# Patient Record
Sex: FEMALE | Race: WHITE | Employment: FULL TIME | URBAN - METROPOLITAN AREA
[De-identification: names, ages, dates, MRNs, and addresses within clinical notes are randomized per-mention and may not be internally consistent; named-entity substitution may affect disease eponyms.]

---
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
## 2018-03-06 ENCOUNTER — OFFICE VISIT (OUTPATIENT)
Dept: FAMILY MEDICINE | Facility: CLINIC | Age: 18
End: 2018-03-06
Payer: COMMERCIAL

## 2018-03-06 VITALS
HEART RATE: 96 BPM | BODY MASS INDEX: 19.79 KG/M2 | OXYGEN SATURATION: 98 % | SYSTOLIC BLOOD PRESSURE: 98 MMHG | WEIGHT: 130.6 LBS | TEMPERATURE: 98.4 F | DIASTOLIC BLOOD PRESSURE: 60 MMHG | HEIGHT: 68 IN

## 2018-03-06 DIAGNOSIS — J06.9 VIRAL URI: Primary | ICD-10-CM

## 2018-03-06 PROCEDURE — 99213 OFFICE O/P EST LOW 20 MIN: CPT | Performed by: PHYSICIAN ASSISTANT

## 2018-03-06 NOTE — NURSING NOTE
"Chief Complaint   Patient presents with     Cough       Initial BP 98/60 (BP Location: Right arm, Patient Position: Chair, Cuff Size: Adult Regular)  Pulse 96  Temp 98.4  F (36.9  C) (Oral)  Ht 5' 8\" (1.727 m)  Wt 130 lb 9.6 oz (59.2 kg)  SpO2 98%  BMI 19.86 kg/m2 Estimated body mass index is 19.86 kg/(m^2) as calculated from the following:    Height as of this encounter: 5' 8\" (1.727 m).    Weight as of this encounter: 130 lb 9.6 oz (59.2 kg).  Medication Reconciliation: complete      Health Maintenance addressed:  NONE      n/a    "

## 2018-03-06 NOTE — PROGRESS NOTES
"  SUBJECTIVE:   Sola Bowles is a 17 year old female who presents to clinic today for the following health issues:      Acute Illness   Acute illness concerns: cough  Onset: 7 days    Fever: no    Chills/Sweats: no    Headache (location?): YES    Sinus Pressure:no    Conjunctivitis:  no    Ear Pain: no    Rhinorrhea: YES    Congestion: YES    Sore Throat: YES     Cough: YES-productive of yellow sputum, productive of green sputum and some blood    Wheeze: no     Decreased Appetite: no    Nausea: no    Vomiting: no    Diarrhea:  no    Dysuria/Freq.: no    Fatigue/Achiness: no    Sick/Strep Exposure: no     Therapies Tried and outcome:           Problem list and histories reviewed & adjusted, as indicated.  Additional history: as documented    Current Outpatient Prescriptions   Medication Sig Dispense Refill     drospirenone-ethinyl estradiol (SANTI) 3-0.03 MG per tablet Take 1 tablet by mouth daily 84 tablet 3     drospirenone-ethinyl estradiol (SANTI) 3-0.03 MG per tablet Take 1 tablet by mouth daily 84 tablet 3     BP Readings from Last 3 Encounters:   03/06/18 98/60   12/27/17 104/74   08/17/17 98/62    Wt Readings from Last 3 Encounters:   03/06/18 130 lb 9.6 oz (59.2 kg) (54 %)*   12/27/17 135 lb (61.2 kg) (62 %)*   08/17/17 140 lb 6.4 oz (63.7 kg) (71 %)*     * Growth percentiles are based on CDC 2-20 Years data.                    Reviewed and updated as needed this visit by clinical staff       Reviewed and updated as needed this visit by Provider         ROS:  Constitutional, HEENT, cardiovascular, pulmonary, gi and gu systems are negative, except as otherwise noted.    OBJECTIVE:                                                    BP 98/60 (BP Location: Right arm, Patient Position: Chair, Cuff Size: Adult Regular)  Pulse 96  Temp 98.4  F (36.9  C) (Oral)  Ht 5' 8\" (1.727 m)  Wt 130 lb 9.6 oz (59.2 kg)  SpO2 98%  BMI 19.86 kg/m2  Body mass index is 19.86 kg/(m^2).  GENERAL APPEARANCE: healthy, " alert, no distress   HENT: ear canals and TM's normal and nose and mouth without ulcers or lesions  RESP: lungs clear to auscultation - no rales, rhonchi or wheezes  CV: regular rates and rhythm, normal S1 S2, no S3 or S4 and no murmur, click or rub  LYMPHATICS: no cervical adenopathy    Diagnostic test results:  Diagnostic Test Results:  none      ASSESSMENT/PLAN:                                                    (J06.9,  B97.89) Viral URI  (primary encounter diagnosis)  Comment:   Plan: The patient is advised to push fluids, rest, gargle warm salt water, use vaporizer or mist needed  and Return office visit if symptoms persist or worsen.          There are no Patient Instructions on file for this visit.    Ramona Ann Aaseby-Aguilera, PA-C  [facility]

## 2018-03-06 NOTE — MR AVS SNAPSHOT
"              After Visit Summary   3/6/2018    Sola Bowles    MRN: [id number]           Patient Information     Date Of Birth          2000        Visit Information        Provider Department      3/6/2018 9:45 AM Aaseby-Aguilera, Ramona Ann, PA-C [facility]        Today's Diagnoses     Viral URI    -  1       Follow-ups after your visit        Who to contact     If you have questions or need follow up information about today's clinic visit or your schedule please contact [facility] directly at [phone].  Normal or non-critical lab and imaging results will be communicated to you by [employer], letter or phone within 4 business days after the clinic has received the results. If you do not hear from us within 7 days, please contact the clinic through [employer] or phone. If you have a critical or abnormal lab result, we will notify you by phone as soon as possible.  Submit refill requests through [employer] or call your pharmacy and they will forward the refill request to us. Please allow 3 business days for your refill to be completed.          Additional Information About Your Visit        MyChart Information     [employer] gives you secure access to your electronic health record. If you see a primary care provider, you can also send messages to your care team and make appointments. If you have questions, please call your primary care clinic.  If you do not have a primary care provider, please call [phone] and they will assist you.        Care EveryWhere ID     This is your Care EveryWhere ID. This could be used by other organizations to access your [facility] medical records  [id number]        Your Vitals Were     Pulse Temperature Height Pulse Oximetry BMI (Body Mass Index)       96 98.4  F (36.9  C) (Oral) 5' 8\" (1.727 m) 98% 19.86 kg/m2        Blood Pressure from Last 3 Encounters:   03/06/18 98/60   12/27/17 104/74   08/17/17 98/62    Weight from Last 3 Encounters: "   03/06/18 130 lb 9.6 oz (59.2 kg) (54 %)*   12/27/17 135 lb (61.2 kg) (62 %)*   08/17/17 140 lb 6.4 oz (63.7 kg) (71 %)*     * Growth percentiles are based on [facility] 2-20 Years data.              Today, you had the following     No orders found for display         Today's Medication Changes          These changes are accurate as of 3/6/18 10:04 AM.  If you have any questions, ask your nurse or doctor.               Stop taking these medicines if you haven't already. Please contact your care team if you have questions.     ondansetron 4 MG ODT tab   Commonly known as:  ZOFRAN ODT   Stopped by:  Aaseby-Aguilera, Ramona Ann, PA-C                    Primary Care Provider Office Phone # Fax #    Ramona Ann Aaseby-Aguilera, PA-C [phone] [address] [facility] [address]        Equal Access to Services     [facility] HospitalBALDOMERO : Hadii aad ku hadasho Soomaali, waaxda luqadaha, qaybta kaalmada adeegyada, waxay idiin hayaan thoeg kharasridhar thomas . So [facility] [phone].    ATENCIÓN: Si habla español, tiene a grnat disposición servicios gratuitos de asistencia lingüística. LlPremier Health [facility] South [phone].    We comply with applicable federal civil rights laws and [place] laws. We do not discriminate on the basis of race, color, national origin, age, disability, sex, sexual orientation, or gender identity.            Thank you!     Thank you for choosing [facility]  for your care. Our goal is always to provide you with excellent care. Hearing back from our patients is one way we can continue to improve our services. Please take a few minutes to complete the written survey that you may receive in the mail after your visit with us. Thank you!             Your Updated Medication List - Protect others around you: Learn how to safely use, store and throw away your medicines at www.disposemymeds.org.          This list is accurate as of 3/6/18 10:04 AM.  Always use your most recent med list.                    Brand Name Dispense Instructions for use Diagnosis    * drospirenone-ethinyl estradiol 3-0.03 MG per tablet    SANTI    84 tablet    Take 1 tablet by mouth daily    Encounter for initial prescription of contraceptive pills       * drospirenone-ethinyl estradiol 3-0.03 MG per tablet    SANTI    84 tablet    Take 1 tablet by mouth daily    PCOS (polycystic ovarian syndrome)       * Notice:  This list has 2 medication(s) that are the same as other medications prescribed for you. Read the directions carefully, and ask your doctor or other care provider to review them with you.

## 2018-03-20 ENCOUNTER — RECORDS - HEALTHEAST (OUTPATIENT)
Dept: ADMINISTRATIVE | Facility: OTHER | Age: 18
End: 2018-03-20

## 2018-05-22 ENCOUNTER — TRANSFERRED RECORDS (OUTPATIENT)
Dept: HEALTH INFORMATION MANAGEMENT | Facility: CLINIC | Age: 18
End: 2018-05-22

## 2018-05-25 ENCOUNTER — OFFICE VISIT (OUTPATIENT)
Dept: FAMILY MEDICINE | Facility: CLINIC | Age: 18
End: 2018-05-25
Payer: COMMERCIAL

## 2018-05-25 VITALS
DIASTOLIC BLOOD PRESSURE: 79 MMHG | SYSTOLIC BLOOD PRESSURE: 114 MMHG | HEIGHT: 68 IN | TEMPERATURE: 98 F | BODY MASS INDEX: 19.55 KG/M2 | HEART RATE: 72 BPM | RESPIRATION RATE: 16 BRPM | WEIGHT: 129 LBS

## 2018-05-25 DIAGNOSIS — R21 RASH: Primary | ICD-10-CM

## 2018-05-25 LAB
BASOPHILS # BLD AUTO: 0 10E9/L (ref 0–0.2)
BASOPHILS NFR BLD AUTO: 0.4 %
DIFFERENTIAL METHOD BLD: NORMAL
EOSINOPHIL # BLD AUTO: 0.1 10E9/L (ref 0–0.7)
EOSINOPHIL NFR BLD AUTO: 1.1 %
ERYTHROCYTE [DISTWIDTH] IN BLOOD BY AUTOMATED COUNT: 14 % (ref 10–15)
HCT VFR BLD AUTO: 40.2 % (ref 35–47)
HGB BLD-MCNC: 13.3 G/DL (ref 11.7–15.7)
LYMPHOCYTES # BLD AUTO: 1.9 10E9/L (ref 0.8–5.3)
LYMPHOCYTES NFR BLD AUTO: 40 %
MCH RBC QN AUTO: 28.1 PG (ref 26.5–33)
MCHC RBC AUTO-ENTMCNC: 33.1 G/DL (ref 31.5–36.5)
MCV RBC AUTO: 85 FL (ref 78–100)
MONOCYTES # BLD AUTO: 0.5 10E9/L (ref 0–1.3)
MONOCYTES NFR BLD AUTO: 10.2 %
NEUTROPHILS # BLD AUTO: 2.3 10E9/L (ref 1.6–8.3)
NEUTROPHILS NFR BLD AUTO: 48.3 %
PLATELET # BLD AUTO: 240 10E9/L (ref 150–450)
RBC # BLD AUTO: 4.73 10E12/L (ref 3.8–5.2)
WBC # BLD AUTO: 4.7 10E9/L (ref 4–11)

## 2018-05-25 PROCEDURE — 36415 COLL VENOUS BLD VENIPUNCTURE: CPT | Performed by: PHYSICIAN ASSISTANT

## 2018-05-25 PROCEDURE — 99213 OFFICE O/P EST LOW 20 MIN: CPT | Performed by: PHYSICIAN ASSISTANT

## 2018-05-25 PROCEDURE — 85025 COMPLETE CBC W/AUTO DIFF WBC: CPT | Performed by: PHYSICIAN ASSISTANT

## 2018-05-25 RX ORDER — DROSPIRENONE AND ETHINYL ESTRADIOL 0.02-3(28)
KIT ORAL
COMMUNITY
Start: 2018-03-21 | End: 2020-12-03

## 2018-05-25 RX ORDER — DOXYCYCLINE 100 MG/1
100 CAPSULE ORAL
COMMUNITY
Start: 2018-05-22 | End: 2018-05-29

## 2018-05-25 NOTE — PROGRESS NOTES
"  SUBJECTIVE:   Sola Bowles is a 18 year old female who presents to clinic today for the following health issues:      Follow up on UC visit : Brandy was seen in urgent care at school  for a lump in rt axilla which turned out to be infectious.  Was given an antibiotic and this cleared up.  Was told may be due to ingrown hair.  Told to change razoe blades.  Now she noticed a painful rash in left axilla. Still has 2 days left on antibiotic        Problem list and histories reviewed & adjusted, as indicated.  Additional history: as documented        Reviewed and updated as needed this visit by clinical staff  Tobacco  Allergies  Meds  Med Hx  Surg Hx  Fam Hx  Soc Hx      Reviewed and updated as needed this visit by Provider         ROS:  Constitutional, HEENT, cardiovascular, pulmonary, gi and gu systems are negative, except as otherwise noted.    OBJECTIVE:                                                    /79 (BP Location: Right arm, Patient Position: Chair, Cuff Size: Adult Regular)  Pulse 72  Temp 98  F (36.7  C) (Oral)  Resp 16  Ht 5' 8\" (1.727 m)  Wt 129 lb (58.5 kg)  LMP 04/25/2018  Breastfeeding? No  BMI 19.61 kg/m2  Body mass index is 19.61 kg/(m^2).  GENERAL APPEARANCE: healthy, alert and no distress  RESP: lungs clear to auscultation - no rales, rhonchi or wheezes  CV: regular rates and rhythm, normal S1 S2, no S3 or S4 and no murmur, click or rub  LYMPHATICS: no cervical adenopathy  SKIN:  2 x 1 cm mild erythema in left axilla, no swelling or discreet pustules         ASSESSMENT/PLAN:                                                    1. Rash   resolving.  Treated for folliculitis previously.  Advised to change razor and deoderant .  Please follow-up if symptoms fail to resolve or worsen    - CBC with platelets differential          Ramona Ann Aaseby-Aguilera, PA-C  [facility]      "

## 2018-05-25 NOTE — MR AVS SNAPSHOT
"              After Visit Summary   5/25/2018    Sola Bowles    MRN: [id number]           Patient Information     Date Of Birth          2000        Visit Information        Provider Department      5/25/2018 10:45 AM Aaseby-Aguilera, Ramona Ann, PA-C [facility]        Today's Diagnoses     Rash    -  1       Follow-ups after your visit        Who to contact     If you have questions or need follow up information about today's clinic visit or your schedule please contact [facility] directly at [phone].  Normal or non-critical lab and imaging results will be communicated to you by "[employer]"hart, letter or phone within 4 business days after the clinic has received the results. If you do not hear from us within 7 days, please contact the clinic through [employer] or phone. If you have a critical or abnormal lab result, we will notify you by phone as soon as possible.  Submit refill requests through [employer] or call your pharmacy and they will forward the refill request to us. Please allow 3 business days for your refill to be completed.          Additional Information About Your Visit        MyChart Information     [employer] gives you secure access to your electronic health record. If you see a primary care provider, you can also send messages to your care team and make appointments. If you have questions, please call your primary care clinic.  If you do not have a primary care provider, please call [phone] and they will assist you.        Care EveryWhere ID     This is your Care EveryWhere ID. This could be used by other organizations to access your [facility] medical records  [id number]        Your Vitals Were     Pulse Temperature Respirations Height Last Period Breastfeeding?    72 98  F (36.7  C) (Oral) 16 5' 8\" (1.727 m) 04/25/2018 No    BMI (Body Mass Index)                   19.61 kg/m2            Blood Pressure from Last 3 Encounters:   05/25/18 114/79   03/06/18 98/60 "   12/27/17 104/74    Weight from Last 3 Encounters:   05/25/18 129 lb (58.5 kg)   03/06/18 130 lb 9.6 oz (59.2 kg) (54 %)*   12/27/17 135 lb (61.2 kg) (62 %)*     * Growth percentiles are based on [facility] 2-20 Years data.              We Performed the Following     CBC with platelets differential        Primary Care Provider Office Phone # Fax #    Ramona Ann Aaseby-Aguilera, PA-C [phone] [id number] [facility] [id number]        Equal Access to Services     [facility]: Hadii aad ku hadasho Soomaali, waaxda luqadaha, qaybta kaalmada adeana mariayada, kevin thomas . So [facility] [phone].    ATENCIÓN: Si habla español, tiene a grant disposición servicios gratuitos de asistencia lingüística. Llame al [phone].    We comply with applicable federal civil rights laws and [place] laws. We do not discriminate on the basis of race, color, national origin, age, disability, sex, sexual orientation, or gender identity.            Thank you!     Thank you for choosing [facility]  for your care. Our goal is always to provide you with excellent care. Hearing back from our patients is one way we can continue to improve our services. Please take a few minutes to complete the written survey that you may receive in the mail after your visit with us. Thank you!             Your Updated Medication List - Protect others around you: Learn how to safely use, store and throw away your medicines at www.disposemymeds.org.          This list is accurate as of 5/25/18 11:59 PM.  Always use your most recent med list.                   Brand Name Dispense Instructions for use Diagnosis    doxycycline 100 MG capsule    VIBRAMYCIN     Take 100 mg by mouth        ROXIE 3-0.02 MG per tablet   Generic drug:  drospirenone-ethinyl estradiol

## 2018-09-13 ENCOUNTER — RECORDS - HEALTHEAST (OUTPATIENT)
Dept: ADMINISTRATIVE | Facility: OTHER | Age: 18
End: 2018-09-13

## 2019-01-18 NOTE — TELEPHONE ENCOUNTER
Please review labs and advise to abnormal results mom & pt calling for results/     Kate Sanchez RN     Abdomen soft, nontender, nondistended, bowel sounds present in all 4 quadrants.

## 2019-06-19 ENCOUNTER — RECORDS - HEALTHEAST (OUTPATIENT)
Dept: LAB | Facility: HOSPITAL | Age: 19
End: 2019-06-19

## 2019-06-21 LAB
GAMMA INTERFERON BACKGROUND BLD IA-ACNC: 0.05 IU/ML
M TB IFN-G BLD-IMP: NEGATIVE
MITOGEN IGNF BCKGRD COR BLD-ACNC: -0.01 IU/ML
MITOGEN IGNF BCKGRD COR BLD-ACNC: 0 IU/ML
QTF INTERPRETATION: NORMAL
QTF MITOGEN - NIL: 8.03 IU/ML

## 2019-08-01 ENCOUNTER — TRANSFERRED RECORDS (OUTPATIENT)
Dept: HEALTH INFORMATION MANAGEMENT | Facility: CLINIC | Age: 19
End: 2019-08-01

## 2019-08-01 LAB — PAP SMEAR - HIM PATIENT REPORTED: NEGATIVE

## 2019-08-07 ENCOUNTER — RECORDS - HEALTHEAST (OUTPATIENT)
Dept: LAB | Facility: HOSPITAL | Age: 19
End: 2019-08-07

## 2019-08-07 LAB — HBV SURFACE AB SERPL IA-ACNC: POSITIVE M[IU]/ML

## 2019-09-12 ENCOUNTER — RECORDS - HEALTHEAST (OUTPATIENT)
Dept: ADMINISTRATIVE | Facility: OTHER | Age: 19
End: 2019-09-12

## 2019-09-12 LAB — PAP SMEAR - HIM PATIENT REPORTED: NORMAL

## 2019-10-02 ENCOUNTER — HEALTH MAINTENANCE LETTER (OUTPATIENT)
Age: 19
End: 2019-10-02

## 2019-10-30 ENCOUNTER — OFFICE VISIT - HEALTHEAST (OUTPATIENT)
Dept: FAMILY MEDICINE | Facility: CLINIC | Age: 19
End: 2019-10-30

## 2019-10-30 DIAGNOSIS — Z23 NEED FOR INFLUENZA VACCINATION: ICD-10-CM

## 2019-10-30 DIAGNOSIS — G89.29 CHRONIC LOW BACK PAIN WITHOUT SCIATICA, UNSPECIFIED BACK PAIN LATERALITY: ICD-10-CM

## 2019-10-30 DIAGNOSIS — Z13.0 SCREENING FOR ENDOCRINE, NUTRITIONAL, METABOLIC AND IMMUNITY DISORDER: ICD-10-CM

## 2019-10-30 DIAGNOSIS — Z11.4 SCREENING FOR HIV WITHOUT PRESENCE OF RISK FACTORS: ICD-10-CM

## 2019-10-30 DIAGNOSIS — G43.109 MIGRAINE WITH AURA AND WITHOUT STATUS MIGRAINOSUS, NOT INTRACTABLE: ICD-10-CM

## 2019-10-30 DIAGNOSIS — M54.50 CHRONIC LOW BACK PAIN WITHOUT SCIATICA, UNSPECIFIED BACK PAIN LATERALITY: ICD-10-CM

## 2019-10-30 DIAGNOSIS — Z00.00 VISIT FOR PREVENTIVE HEALTH EXAMINATION: ICD-10-CM

## 2019-10-30 DIAGNOSIS — Z11.8 SPECIAL SCREENING EXAMINATION FOR CHLAMYDIAL DISEASE: ICD-10-CM

## 2019-10-30 DIAGNOSIS — Z13.29 SCREENING FOR ENDOCRINE, NUTRITIONAL, METABOLIC AND IMMUNITY DISORDER: ICD-10-CM

## 2019-10-30 DIAGNOSIS — Z13.228 SCREENING FOR ENDOCRINE, NUTRITIONAL, METABOLIC AND IMMUNITY DISORDER: ICD-10-CM

## 2019-10-30 DIAGNOSIS — Z13.21 SCREENING FOR ENDOCRINE, NUTRITIONAL, METABOLIC AND IMMUNITY DISORDER: ICD-10-CM

## 2019-10-30 LAB
ALBUMIN SERPL-MCNC: 3.7 G/DL (ref 3.5–5)
ALP SERPL-CCNC: 52 U/L (ref 45–120)
ALT SERPL W P-5'-P-CCNC: 11 U/L (ref 0–45)
ANION GAP SERPL CALCULATED.3IONS-SCNC: 9 MMOL/L (ref 5–18)
AST SERPL W P-5'-P-CCNC: 19 U/L (ref 0–40)
BILIRUB SERPL-MCNC: 0.8 MG/DL (ref 0–1)
BUN SERPL-MCNC: 16 MG/DL (ref 8–22)
C REACTIVE PROTEIN LHE: 0.2 MG/DL (ref 0–0.8)
CALCIUM SERPL-MCNC: 9.6 MG/DL (ref 8.5–10.5)
CHLORIDE BLD-SCNC: 103 MMOL/L (ref 98–107)
CHOLEST SERPL-MCNC: 246 MG/DL
CO2 SERPL-SCNC: 25 MMOL/L (ref 22–31)
CREAT SERPL-MCNC: 0.8 MG/DL (ref 0.6–1.1)
ERYTHROCYTE [SEDIMENTATION RATE] IN BLOOD BY WESTERGREN METHOD: 11 MM/HR (ref 0–20)
FASTING STATUS PATIENT QL REPORTED: ABNORMAL
GFR SERPL CREATININE-BSD FRML MDRD: >60 ML/MIN/1.73M2
GLUCOSE BLD-MCNC: 89 MG/DL (ref 70–125)
HDLC SERPL-MCNC: 102 MG/DL
HIV 1+2 AB+HIV1 P24 AG SERPL QL IA: NEGATIVE
LDLC SERPL CALC-MCNC: 129 MG/DL
POTASSIUM BLD-SCNC: 3.7 MMOL/L (ref 3.5–5)
PROT SERPL-MCNC: 7 G/DL (ref 6–8)
RHEUMATOID FACT SERPL-ACNC: <15 IU/ML (ref 0–30)
SODIUM SERPL-SCNC: 137 MMOL/L (ref 136–145)
TRIGL SERPL-MCNC: 77 MG/DL
URATE SERPL-MCNC: 4.5 MG/DL (ref 2–7.5)

## 2019-10-30 ASSESSMENT — MIFFLIN-ST. JEOR: SCORE: 1358.02

## 2019-10-31 LAB
ANA SER QL: 0.3 U
B BURGDOR IGG+IGM SER QL: 0.06 INDEX VALUE
C TRACH DNA SPEC QL PROBE+SIG AMP: NEGATIVE
CCP AB SER IA-ACNC: <0.5 U/ML
N GONORRHOEA DNA SPEC QL NAA+PROBE: NEGATIVE

## 2019-11-01 ENCOUNTER — COMMUNICATION - HEALTHEAST (OUTPATIENT)
Dept: FAMILY MEDICINE | Facility: CLINIC | Age: 19
End: 2019-11-01

## 2019-11-01 DIAGNOSIS — M54.50 CHRONIC LOW BACK PAIN WITHOUT SCIATICA, UNSPECIFIED BACK PAIN LATERALITY: ICD-10-CM

## 2019-11-01 DIAGNOSIS — G89.29 CHRONIC LOW BACK PAIN WITHOUT SCIATICA, UNSPECIFIED BACK PAIN LATERALITY: ICD-10-CM

## 2019-11-04 LAB
B LOCUS: NORMAL
B27TEST METHOD: NORMAL

## 2019-11-12 ENCOUNTER — OFFICE VISIT - HEALTHEAST (OUTPATIENT)
Dept: PHYSICAL THERAPY | Facility: REHABILITATION | Age: 19
End: 2019-11-12

## 2019-11-12 DIAGNOSIS — M62.81 MUSCLE WEAKNESS (GENERALIZED): ICD-10-CM

## 2019-11-12 DIAGNOSIS — G89.29 CHRONIC LOW BACK PAIN WITHOUT SCIATICA, UNSPECIFIED BACK PAIN LATERALITY: ICD-10-CM

## 2019-11-12 DIAGNOSIS — M54.50 CHRONIC LOW BACK PAIN WITHOUT SCIATICA, UNSPECIFIED BACK PAIN LATERALITY: ICD-10-CM

## 2019-12-04 ENCOUNTER — OFFICE VISIT - HEALTHEAST (OUTPATIENT)
Dept: PHYSICAL THERAPY | Facility: REHABILITATION | Age: 19
End: 2019-12-04

## 2019-12-04 ENCOUNTER — RECORDS - HEALTHEAST (OUTPATIENT)
Dept: HEALTH INFORMATION MANAGEMENT | Facility: CLINIC | Age: 19
End: 2019-12-04

## 2019-12-04 DIAGNOSIS — G89.29 CHRONIC LOW BACK PAIN WITHOUT SCIATICA, UNSPECIFIED BACK PAIN LATERALITY: ICD-10-CM

## 2019-12-04 DIAGNOSIS — M62.81 MUSCLE WEAKNESS (GENERALIZED): ICD-10-CM

## 2019-12-04 DIAGNOSIS — M54.50 CHRONIC LOW BACK PAIN WITHOUT SCIATICA, UNSPECIFIED BACK PAIN LATERALITY: ICD-10-CM

## 2020-02-29 NOTE — ADDENDUM NOTE
Addended by: AASEBY-AGUILERA, RAMONA on: 6/29/2017 12:57 PM     Modules accepted: Orders     Statement Selected

## 2020-04-13 ENCOUNTER — VIRTUAL VISIT (OUTPATIENT)
Dept: FAMILY MEDICINE | Facility: OTHER | Age: 20
End: 2020-04-13

## 2020-04-14 NOTE — PROGRESS NOTES
"Date: 2020 13:52:09  Clinician: Armand lAarcon  Clinician NPI: [id number]  Patient: Sola Walker  Patient : 2000  Patient Address: [address], [place] [address]  Patient Phone: [phone]  Visit Protocol: UTI  Patient Summary:  Sola is a 20 year old ( : 2000 ) female who initiated a Visit for a presumed bladder infection. When asked the question \"Please sign me up to receive news, health information and promotions from [employer].\", Sola responded \"No\".   Her symptoms started 1-3 days ago and consist of dysuria, feeling as if the bladder is never empty, urinary frequency, and urgency.   Symptom details   Urine color: Her urine does not have any color.    Denied symptoms include foul-smelling urine, nausea, flank pain, abdominal pain, chills, vaginal discharge, urinary incontinence, vomiting, and vaginal itching. She does not feel feverish.   Sola has not used any over-the-counter medications or home remedies to relieve her current symptoms.  Precipitating events  Sola denies having a sexually transmitted disease.  Pertinent medical history  Sola typically gets a yeast infection when she takes antibiotics. She has used fluconazole (Diflucan) to treat previous yeast infections. She is not sure if she has needed 1 or 2 doses of fluconazole (Diflucan) for symptoms to resolve in the past.   She has not experienced problems or side effects with any of the common antibiotics used to treat bladder infections.   Sola does not have a history of bladder infections or kidney stones. She has not used a catheter or been a patient in a hospital or nursing home in the past 2 weeks.   Sola does not smoke or use smokeless tobacco.   She denies pregnancy and denies breastfeeding. Her last period was over a month ago.     MEDICATIONS: RANI (28) oral, rizatriptan oral, ALLERGIES: NKDA  Clinician Response:  Dear Sola,  Based on the information you have provided, you likely have an acute " urinary tract infection, also called a bladder infection. Bladder infections occur when bacteria from the outside of the body enters the urinary tract. Any part of the urinary system can be infected, but the bladder is the most common.  Medication information  I am prescribing:     Nitrofurantoin monohyd/m-cryst (Macrobid) 100 mg oral capsule. Take 1 capsule by mouth every 12 hours for 5 days. Take this medication with food. There are no refills with this prescription.   The medication I prescribed for your bladder infection is an antibiotic. Continue taking the medication until it is gone even if you feel better.   Because you usually get a yeast infection when taking antibiotics, I am also prescribing:     Fluconazole (Diflucan) 150 mg oral tablet. Take 1 tablet by mouth 1 time a day for 1 day. There are no refills with this prescription.   Self care  Urination helps to flush bacteria from the urinary tract. For this reason, drinking water and urinating often helps relieve some urinary symptoms and can decrease your risk of getting bladder infections in the future.  Other steps you can take to prevent future bladder infections include:     Wipe front to back after using the bathroom    Urinate after sexual intercourse    Avoid using deodorant sprays, douches, or powders in the vaginal area     When to seek care  Please make an appointment to be seen in a clinic or urgent care if any of the following occur:     You develop new symptoms or your symptoms become worse    You have medication side effects that make it difficult to take them as prescribed    Your symptoms do not improve within 1-2 days of starting treatment    You have symptoms of a bladder infection that return shortly after completing treatment     It is possible to have an allergic reaction to an antibiotic even if you have not had one in the past. If you notice a new rash, significant swelling, or difficulty breathing, stop taking this medication  immediately and go to a clinic or urgent care.   Diagnosis: Acute uncomplicated bladder infection  Diagnosis ICD: N39.0  Prescription: fluconazole (Diflucan) 150 mg oral tablet 1 tablet, 1 days supply. Take 1 tablet by mouth 1 time a day for 1 day. Refills: 0, Refill as needed: no, Allow substitutions: yes  Prescription: nitrofurantoin monohyd/m-cryst (Macrobid) 100 mg oral capsule 10 capsule, 5 days supply. Take 1 capsule by mouth every 12 hours for 5 days. Refills: 0, Refill as needed: no, Allow substitutions: yes  Pharmacy: [id number] - [phone] - [address], [place] [address]

## 2020-08-31 ENCOUNTER — COMMUNICATION - HEALTHEAST (OUTPATIENT)
Dept: FAMILY MEDICINE | Facility: CLINIC | Age: 20
End: 2020-08-31

## 2020-12-03 ENCOUNTER — VIRTUAL VISIT (OUTPATIENT)
Dept: FAMILY MEDICINE | Facility: CLINIC | Age: 20
End: 2020-12-03
Payer: COMMERCIAL

## 2020-12-03 DIAGNOSIS — R42 DIZZINESS: ICD-10-CM

## 2020-12-03 DIAGNOSIS — U07.1 INFECTION DUE TO 2019 NOVEL CORONAVIRUS: Primary | ICD-10-CM

## 2020-12-03 PROBLEM — G43.109 MIGRAINE WITH AURA AND WITHOUT STATUS MIGRAINOSUS, NOT INTRACTABLE: Status: ACTIVE | Noted: 2019-11-01

## 2020-12-03 PROCEDURE — 99213 OFFICE O/P EST LOW 20 MIN: CPT | Mod: 95 | Performed by: FAMILY MEDICINE

## 2020-12-03 RX ORDER — MECLIZINE HYDROCHLORIDE 25 MG/1
25 TABLET ORAL 3 TIMES DAILY PRN
Qty: 30 TABLET | Refills: 0 | Status: SHIPPED | OUTPATIENT
Start: 2020-12-03 | End: 2021-01-28

## 2020-12-03 RX ORDER — DROSPIRENONE AND ETHINYL ESTRADIOL 0.03MG-3MG
1 KIT ORAL
COMMUNITY
End: 2020-12-03

## 2020-12-03 RX ORDER — RIZATRIPTAN BENZOATE 5 MG/1
5 TABLET ORAL
COMMUNITY
Start: 2019-01-01 | End: 2021-08-11

## 2020-12-03 NOTE — PROGRESS NOTES
"Sola Bowles is a 20 year old female who is being evaluated via a billable video visit.      The patient has been notified of following:     \"This video visit will be conducted via a call between you and your physician/provider. We have found that certain health care needs can be provided without the need for an in-person physical exam.  This service lets us provide the care you need with a video conversation.  If a prescription is necessary we can send it directly to your pharmacy.  If lab work is needed we can place an order for that and you can then stop by our lab to have the test done at a later time.    Video visits are billed at different rates depending on your insurance coverage.  Please reach out to your insurance provider with any questions.    If during the course of the call the physician/provider feels a video visit is not appropriate, you will not be charged for this service.\"    Patient has given verbal consent for Video visit? Yes  How would you like to obtain your AVS? MyChart  If you are dropped from the video visit, the video invite should be resent to: Text to cell phone: [phone] text   Will anyone else be joining your video visit? No      Subjective     Sola Bowles is a 20 year old female who presents today via video visit for the following health issues:    History of Present Illness       She eats 2-3 servings of fruits and vegetables daily.She consumes 0 sweetened beverage(s) daily.She exercises with enough effort to increase her heart rate 30 to 60 minutes per day.  She exercises with enough effort to increase her heart rate 4 days per week.   She is taking medications regularly.         Concern for COVID-19  About how many days ago did these symptoms start? 2 days  Is this your first visit for this illness? Yes  In the 14 days before your symptoms started, have you had close contact with someone with COVID-19 (Coronavirus)? Yes, I have been in contact with someone who has " COVID-19/Coronavirus (confirmed by lab test).  Do you have a fever or chills? No  Are you having new or worsening difficulty breathing? No  Do you have new or worsening cough? No  Have you had any new or unexplained body aches? YES    Have you experienced any of the following NEW symptoms?    Headache: No    Sore throat: YES    Loss of taste or smell: No    Chest pain: No    Diarrhea: YES    Rash: No  What treatments have you tried? No  Who do you live with?    Are you, or a household member, a healthcare worker or a ? YES  Do you live in a nursing home, group home, or shelter? No  Do you have a way to get food/medications if quarantined? Yes, I have a friend or family member who can help me.        per patient she got a call back this morning that she was COVID positive. Took the saliva test on 12/1.  states her first symptoms were dizziness. If looking straight forward and not moving feels fine.   Has had some diarrhea- states was worse yesterday and no BM so far today.   Of note her  tested positive about 10 days ago.         Video Start Time: 11:41 AM      Review of Systems   Constitutional, HEENT, cardiovascular, pulmonary, GI, , musculoskeletal, neuro, skin, endocrine and psych systems are negative, except as otherwise noted.      Objective           Vitals:  No vitals were obtained today due to virtual visit.    Physical Exam     GENERAL: Healthy, alert and no distress  EYES: Eyes grossly normal to inspection.  No discharge or erythema, or obvious scleral/conjunctival abnormalities.  RESP: No audible wheeze, cough, or visible cyanosis.  No visible retractions or increased work of breathing.    PSYCH: Mentation appears normal, affect normal/bright, judgement and insight intact, normal speech and appearance well-groomed.              Assessment & Plan     Infection due to 2019 novel coronavirus  - continue with supportive care.     Dizziness  - secondary to above. Will start on  trial of meclizine to help with symptoms   - meclizine (ANTIVERT) 25 MG tablet; Take 1 tablet (25 mg) by mouth 3 times daily as needed for dizziness        See Patient Instructions    Return if symptoms worsen or fail to improve.    Elisa Craven MD  [facility]      Video-Visit Details    Type of service:  Video Visit    Video End Time:11:52 AM     Originating Location (pt. Location): Home    Distant Location (provider location):  [facility]     Platform used for Video Visit: Zeynep

## 2020-12-03 NOTE — PATIENT INSTRUCTIONS
"Discharge Instructions for COVID-19 Patients  You have--or may have--COVID-19. Please follow the instructions listed below.   If you have a weakened immune system, discuss with your doctor any other actions you need to take.  How can I protect others?  If you have symptoms (fever, cough, body aches or trouble breathing):    Stay home and away from others (self-isolate) until:  ? At least 10 days have passed since your symptoms started, And   ? You've had no fever--and no medicine that reduces fever--for 1 full day (24 hours), And    ? Your other symptoms have resolved (gotten better).  If you don't show symptoms, but testing showed that you have COVID-19:    Stay home and away from others (self-isolate). Follow the tips under \"How do I self-isolate?\" below for 10 days (20 days if you have a weak immune system).    You don't need to be retested for COVID-19 before going back to school or work. As long as you're fever-free and feeling better, you can go back to school, work and other activities after waiting the 10 or 20 days.   How do I self-isolate?    Stay in your own room, even for meals. Use your own bathroom if you can.    Stay away from others in your home. No hugging, kissing or shaking hands. No visitors.    Don't go to work, school or anywhere else.    Clean \"high touch\" surfaces often (doorknobs, counters, handles). Use household cleaning spray or wipes. You'll find a full list of  on the EPA website: www.epa.gov/pesticide-registration/list-n-disinfectants-use-against-sars-cov-2.    Cover your mouth and nose with a mask or other face covering to avoid spreading germs.    Wash your hands and face often. Use soap and water.    Caregivers in these groups are at risk for severe illness due to COVID-19:  ? People 65 years and older  ? People who live in a nursing home or long-term care facility  ? People with chronic disease (lung, heart, cancer, diabetes, kidney, liver, immunologic)  ? People who have a " weakened immune system, including those who:    Are in cancer treatment    Take medicine that weakens the immune system, such as corticosteroids    Had a bone marrow or organ transplant    Have an immune deficiency    Have poorly controlled HIV or AIDS    Are obese (body mass index of 40 or higher)    Smoke regularly    Caregivers should wear gloves while washing dishes, handling laundry and cleaning bedrooms and bathrooms.    Use caution when washing and drying laundry: Don't shake dirty laundry and use the warmest water setting that you can.    For more tips on managing your health at home, go to www.cdc.gov/coronavirus/2019-ncov/downloads/10Things.pdf.  How can I take care of myself at home?  1. Get lots of rest. Drink extra fluids (unless a doctor has told you not to).    2. Take Tylenol (acetaminophen) for fever or pain. If you have liver or kidney problems, ask your family doctor if it's okay to take Tylenol.     Adults can take either:  ? 650 mg (two 325 mg pills) every 4 to 6 hours, or   ? 1,000 mg (two 500 mg pills) every 8 hours as needed.  ? Note: Don't take more than 3,000 mg in one day. Acetaminophen is found in many medicines (both prescribed and over-the-counter medicines). Read all labels to be sure you don't take too much.   For children, check the Tylenol bottle for the right dose. The dose is based on the child's age or weight.  3. If you have other health problems (like cancer, heart failure, an organ transplant or severe kidney disease): Call your specialty clinic if you don't feel better in the next 2 days.    4. Know when to call 911. Emergency warning signs include:  ? Trouble breathing or shortness of breath  ? Pain or pressure in the chest that doesn't go away  ? Feeling confused like you haven't felt before, or not being able to wake up  ? Bluish-colored lips or face    5. Your doctor may have prescribed a blood thinner medicine. Follow their instructions.  Where can I get more  information?    [facility] - About COVID-19: [email or web address]    CDC - What to Do If You're Sick: www.cdc.gov/coronavirus/2019-ncov/about/steps-when-sick.html    CDC - Ending Home Isolation: www.cdc.gov/coronavirus/2019-ncov/hcp/disposition-in-home-patients.html    CDC - Caring for Someone: www.cdc.gov/coronavirus/2019-ncov/if-you-are-sick/care-for-someone.html    [facility] - Interim Guidance for Hospital Discharge to Home: www.health.[facility].[place]./diseases/coronavirus/hcp/hospdischarge.pdf    [facility] clinical trials (COVID-19 research studies): clinicalaffairs.[facility]-clinical-trials    Below are the COVID-19 hotlines at the [place] Department of Health ([facility]). Interpreters are available.  ? For health questions: Call 587-297-8230 or 1-192.628.8469 (7 a.m. to 7 p.m.)  ? For questions about schools and childcare: Call 473-644-0326 or 1-822.803.5315 (7 a.m. to 7 p.m.)    For informational purposes only. Not to replace the advice of your health care provider. Clinically reviewed by the Infection Prevention Team. Copyright   2020 [employer]. All rights reserved. [employer] 403829 - REV 08/04/20.

## 2021-01-15 ENCOUNTER — HEALTH MAINTENANCE LETTER (OUTPATIENT)
Age: 21
End: 2021-01-15

## 2021-01-25 ASSESSMENT — ENCOUNTER SYMPTOMS
DYSURIA: 0
DIARRHEA: 0
DIZZINESS: 0
WEAKNESS: 0
CONSTIPATION: 0
HEADACHES: 1
BREAST MASS: 0
NAUSEA: 1
HEMATOCHEZIA: 0
JOINT SWELLING: 0
SORE THROAT: 0
MYALGIAS: 0
FREQUENCY: 0
COUGH: 0
PALPITATIONS: 0
HEMATURIA: 0
EYE PAIN: 0
HEARTBURN: 0
FEVER: 0
ARTHRALGIAS: 0
ABDOMINAL PAIN: 0
NERVOUS/ANXIOUS: 0
CHILLS: 0
SHORTNESS OF BREATH: 0
PARESTHESIAS: 0

## 2021-01-28 ENCOUNTER — OFFICE VISIT (OUTPATIENT)
Dept: FAMILY MEDICINE | Facility: CLINIC | Age: 21
End: 2021-01-28
Payer: COMMERCIAL

## 2021-01-28 ENCOUNTER — APPOINTMENT (OUTPATIENT)
Dept: PEDIATRICS | Facility: CLINIC | Age: 21
End: 2021-01-28
Payer: COMMERCIAL

## 2021-01-28 VITALS
BODY MASS INDEX: 18.85 KG/M2 | DIASTOLIC BLOOD PRESSURE: 84 MMHG | HEART RATE: 86 BPM | SYSTOLIC BLOOD PRESSURE: 132 MMHG | TEMPERATURE: 98.1 F | WEIGHT: 124 LBS

## 2021-01-28 DIAGNOSIS — N89.8 VAGINAL ODOR: ICD-10-CM

## 2021-01-28 DIAGNOSIS — G43.109 MIGRAINE WITH AURA AND WITHOUT STATUS MIGRAINOSUS, NOT INTRACTABLE: ICD-10-CM

## 2021-01-28 DIAGNOSIS — Z30.41 ENCOUNTER FOR SURVEILLANCE OF CONTRACEPTIVE PILLS: ICD-10-CM

## 2021-01-28 DIAGNOSIS — Z00.00 ROUTINE GENERAL MEDICAL EXAMINATION AT A HEALTH CARE FACILITY: Primary | ICD-10-CM

## 2021-01-28 LAB
BASOPHILS # BLD AUTO: 0 10E9/L (ref 0–0.2)
BASOPHILS NFR BLD AUTO: 0.3 %
DIFFERENTIAL METHOD BLD: NORMAL
EOSINOPHIL # BLD AUTO: 0.1 10E9/L (ref 0–0.7)
EOSINOPHIL NFR BLD AUTO: 1.1 %
ERYTHROCYTE [DISTWIDTH] IN BLOOD BY AUTOMATED COUNT: 12 % (ref 10–15)
HCT VFR BLD AUTO: 37.8 % (ref 35–47)
HGB BLD-MCNC: 13.1 G/DL (ref 11.7–15.7)
LYMPHOCYTES # BLD AUTO: 2.4 10E9/L (ref 0.8–5.3)
LYMPHOCYTES NFR BLD AUTO: 39 %
MCH RBC QN AUTO: 30.4 PG (ref 26.5–33)
MCHC RBC AUTO-ENTMCNC: 34.7 G/DL (ref 31.5–36.5)
MCV RBC AUTO: 88 FL (ref 78–100)
MONOCYTES # BLD AUTO: 0.6 10E9/L (ref 0–1.3)
MONOCYTES NFR BLD AUTO: 9.1 %
NEUTROPHILS # BLD AUTO: 3.2 10E9/L (ref 1.6–8.3)
NEUTROPHILS NFR BLD AUTO: 50.5 %
PLATELET # BLD AUTO: 232 10E9/L (ref 150–450)
RBC # BLD AUTO: 4.31 10E12/L (ref 3.8–5.2)
SPECIMEN SOURCE: NORMAL
WBC # BLD AUTO: 6.3 10E9/L (ref 4–11)
WET PREP SPEC: NORMAL

## 2021-01-28 PROCEDURE — 80053 COMPREHEN METABOLIC PANEL: CPT | Performed by: FAMILY MEDICINE

## 2021-01-28 PROCEDURE — 80061 LIPID PANEL: CPT | Performed by: FAMILY MEDICINE

## 2021-01-28 PROCEDURE — 99213 OFFICE O/P EST LOW 20 MIN: CPT | Mod: 25 | Performed by: FAMILY MEDICINE

## 2021-01-28 PROCEDURE — 86803 HEPATITIS C AB TEST: CPT | Performed by: FAMILY MEDICINE

## 2021-01-28 PROCEDURE — 99395 PREV VISIT EST AGE 18-39: CPT | Performed by: FAMILY MEDICINE

## 2021-01-28 PROCEDURE — 36415 COLL VENOUS BLD VENIPUNCTURE: CPT | Performed by: FAMILY MEDICINE

## 2021-01-28 PROCEDURE — 85025 COMPLETE CBC W/AUTO DIFF WBC: CPT | Performed by: FAMILY MEDICINE

## 2021-01-28 PROCEDURE — 87210 SMEAR WET MOUNT SALINE/INK: CPT | Performed by: FAMILY MEDICINE

## 2021-01-28 RX ORDER — DROSPIRENONE AND ETHINYL ESTRADIOL 0.03MG-3MG
1 KIT ORAL DAILY
Qty: 84 TABLET | Refills: 3 | Status: SHIPPED | OUTPATIENT
Start: 2021-01-28 | End: 2021-08-11

## 2021-01-28 RX ORDER — VITAMIN B COMPLEX
TABLET ORAL
COMMUNITY
Start: 2020-12-31 | End: 2021-08-11

## 2021-01-28 RX ORDER — RIZATRIPTAN BENZOATE 5 MG/1
TABLET ORAL
Status: CANCELLED | OUTPATIENT
Start: 2021-01-28

## 2021-01-28 RX ORDER — ONDANSETRON 4 MG/1
4 TABLET, FILM COATED ORAL EVERY 8 HOURS PRN
Qty: 30 TABLET | Refills: 1 | Status: SHIPPED | OUTPATIENT
Start: 2021-01-28 | End: 2023-03-30

## 2021-01-28 RX ORDER — RIZATRIPTAN BENZOATE 5 MG/1
5 TABLET ORAL
Qty: 18 TABLET | Refills: 1 | Status: SHIPPED | OUTPATIENT
Start: 2021-01-28 | End: 2023-03-30

## 2021-01-28 ASSESSMENT — ENCOUNTER SYMPTOMS
COUGH: 0
JOINT SWELLING: 0
FEVER: 0
NERVOUS/ANXIOUS: 0
BREAST MASS: 0
SORE THROAT: 0
DYSURIA: 0
HEMATOCHEZIA: 0
SHORTNESS OF BREATH: 0
FREQUENCY: 0
HEMATURIA: 0
HEADACHES: 1
DIARRHEA: 0
PALPITATIONS: 0
CHILLS: 0
DIZZINESS: 0
ARTHRALGIAS: 0
CONSTIPATION: 0
ABDOMINAL PAIN: 0
NAUSEA: 1
MYALGIAS: 0
HEARTBURN: 0
WEAKNESS: 0
EYE PAIN: 0
PARESTHESIAS: 0
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
## 2021-01-29 LAB
ALBUMIN SERPL-MCNC: 3.8 G/DL (ref 3.4–5)
ALP SERPL-CCNC: 48 U/L (ref 40–150)
ALT SERPL W P-5'-P-CCNC: 16 U/L (ref 0–50)
ANION GAP SERPL CALCULATED.3IONS-SCNC: 6 MMOL/L (ref 3–14)
AST SERPL W P-5'-P-CCNC: 14 U/L (ref 0–45)
BILIRUB SERPL-MCNC: 1 MG/DL (ref 0.2–1.3)
BUN SERPL-MCNC: 12 MG/DL (ref 7–30)
CALCIUM SERPL-MCNC: 9.7 MG/DL (ref 8.5–10.1)
CHLORIDE SERPL-SCNC: 105 MMOL/L (ref 94–109)
CHOLEST SERPL-MCNC: 258 MG/DL
CO2 SERPL-SCNC: 25 MMOL/L (ref 20–32)
CREAT SERPL-MCNC: 0.75 MG/DL (ref 0.52–1.04)
GFR SERPL CREATININE-BSD FRML MDRD: >90 ML/MIN/{1.73_M2}
GLUCOSE SERPL-MCNC: 76 MG/DL (ref 70–99)
HCV AB SERPL QL IA: NONREACTIVE
HDLC SERPL-MCNC: 110 MG/DL
LDLC SERPL CALC-MCNC: 126 MG/DL
NONHDLC SERPL-MCNC: 148 MG/DL
POTASSIUM SERPL-SCNC: 3.9 MMOL/L (ref 3.4–5.3)
PROT SERPL-MCNC: 7.5 G/DL (ref 6.8–8.8)
SODIUM SERPL-SCNC: 136 MMOL/L (ref 133–144)
TRIGL SERPL-MCNC: 112 MG/DL

## 2021-02-11 ENCOUNTER — IMMUNIZATION (OUTPATIENT)
Dept: NURSING | Facility: CLINIC | Age: 21
End: 2021-02-11
Payer: COMMERCIAL

## 2021-02-11 PROCEDURE — 91300 PR COVID VAC PFIZER DIL RECON 30 MCG/0.3 ML IM: CPT

## 2021-02-11 PROCEDURE — 0001A PR COVID VAC PFIZER DIL RECON 30 MCG/0.3 ML IM: CPT

## 2021-02-23 ENCOUNTER — APPOINTMENT (OUTPATIENT)
Dept: GENERAL RADIOLOGY | Facility: CLINIC | Age: 21
End: 2021-02-23
Attending: EMERGENCY MEDICINE
Payer: COMMERCIAL

## 2021-02-23 ENCOUNTER — MYC MEDICAL ADVICE (OUTPATIENT)
Dept: FAMILY MEDICINE | Facility: CLINIC | Age: 21
End: 2021-02-23

## 2021-02-23 ENCOUNTER — HOSPITAL ENCOUNTER (EMERGENCY)
Facility: CLINIC | Age: 21
Discharge: HOME | End: 2021-02-23
Attending: EMERGENCY MEDICINE | Admitting: EMERGENCY MEDICINE
Payer: COMMERCIAL

## 2021-02-23 VITALS
WEIGHT: 125 LBS | HEIGHT: 68 IN | RESPIRATION RATE: 16 BRPM | OXYGEN SATURATION: 100 % | DIASTOLIC BLOOD PRESSURE: 80 MMHG | SYSTOLIC BLOOD PRESSURE: 123 MMHG | TEMPERATURE: 97.6 F | BODY MASS INDEX: 18.94 KG/M2 | HEART RATE: 79 BPM

## 2021-02-23 DIAGNOSIS — R07.9 CHEST PAIN, UNSPECIFIED TYPE: ICD-10-CM

## 2021-02-23 LAB
ALBUMIN SERPL-MCNC: 3.9 G/DL (ref 3.4–5)
ALP SERPL-CCNC: 47 U/L (ref 40–150)
ALT SERPL W P-5'-P-CCNC: 19 U/L (ref 0–50)
ANION GAP SERPL CALCULATED.3IONS-SCNC: 7 MMOL/L (ref 3–14)
AST SERPL W P-5'-P-CCNC: 11 U/L (ref 0–45)
B-HCG FREE SERPL-ACNC: <5 IU/L
BASOPHILS # BLD AUTO: 0 10E9/L (ref 0–0.2)
BASOPHILS NFR BLD AUTO: 0.4 %
BILIRUB SERPL-MCNC: 0.7 MG/DL (ref 0.2–1.3)
BUN SERPL-MCNC: 15 MG/DL (ref 7–30)
CALCIUM SERPL-MCNC: 8.9 MG/DL (ref 8.5–10.1)
CHLORIDE SERPL-SCNC: 107 MMOL/L (ref 94–109)
CO2 SERPL-SCNC: 23 MMOL/L (ref 20–32)
CREAT SERPL-MCNC: 0.78 MG/DL (ref 0.52–1.04)
D DIMER PPP FEU-MCNC: <0.3 UG/ML FEU (ref 0–0.5)
DIFFERENTIAL METHOD BLD: NORMAL
EOSINOPHIL # BLD AUTO: 0.1 10E9/L (ref 0–0.7)
EOSINOPHIL NFR BLD AUTO: 0.9 %
ERYTHROCYTE [DISTWIDTH] IN BLOOD BY AUTOMATED COUNT: 11.7 % (ref 10–15)
GFR SERPL CREATININE-BSD FRML MDRD: >90 ML/MIN/{1.73_M2}
GLUCOSE SERPL-MCNC: 113 MG/DL (ref 70–99)
HCT VFR BLD AUTO: 39.2 % (ref 35–47)
HGB BLD-MCNC: 13.5 G/DL (ref 11.7–15.7)
IMM GRANULOCYTES # BLD: 0 10E9/L (ref 0–0.4)
IMM GRANULOCYTES NFR BLD: 0.2 %
INTERPRETATION ECG - MUSE: NORMAL
LYMPHOCYTES # BLD AUTO: 1.7 10E9/L (ref 0.8–5.3)
LYMPHOCYTES NFR BLD AUTO: 31.1 %
MCH RBC QN AUTO: 29.9 PG (ref 26.5–33)
MCHC RBC AUTO-ENTMCNC: 34.4 G/DL (ref 31.5–36.5)
MCV RBC AUTO: 87 FL (ref 78–100)
MONOCYTES # BLD AUTO: 0.4 10E9/L (ref 0–1.3)
MONOCYTES NFR BLD AUTO: 6.8 %
NEUTROPHILS # BLD AUTO: 3.2 10E9/L (ref 1.6–8.3)
NEUTROPHILS NFR BLD AUTO: 60.6 %
NRBC # BLD AUTO: 0 10*3/UL
NRBC BLD AUTO-RTO: 0 /100
PLATELET # BLD AUTO: 212 10E9/L (ref 150–450)
POTASSIUM SERPL-SCNC: 3.5 MMOL/L (ref 3.4–5.3)
PROT SERPL-MCNC: 7.5 G/DL (ref 6.8–8.8)
RBC # BLD AUTO: 4.52 10E12/L (ref 3.8–5.2)
SODIUM SERPL-SCNC: 137 MMOL/L (ref 133–144)
TROPONIN I SERPL-MCNC: <0.015 UG/L (ref 0–0.04)
WBC # BLD AUTO: 5.3 10E9/L (ref 4–11)

## 2021-02-23 PROCEDURE — 250N000011 HC RX IP 250 OP 636: Performed by: EMERGENCY MEDICINE

## 2021-02-23 PROCEDURE — 84484 ASSAY OF TROPONIN QUANT: CPT | Performed by: EMERGENCY MEDICINE

## 2021-02-23 PROCEDURE — 96361 HYDRATE IV INFUSION ADD-ON: CPT

## 2021-02-23 PROCEDURE — 93005 ELECTROCARDIOGRAM TRACING: CPT

## 2021-02-23 PROCEDURE — 99285 EMERGENCY DEPT VISIT HI MDM: CPT | Mod: 25

## 2021-02-23 PROCEDURE — 84702 CHORIONIC GONADOTROPIN TEST: CPT

## 2021-02-23 PROCEDURE — 85025 COMPLETE CBC W/AUTO DIFF WBC: CPT | Performed by: EMERGENCY MEDICINE

## 2021-02-23 PROCEDURE — 71046 X-RAY EXAM CHEST 2 VIEWS: CPT

## 2021-02-23 PROCEDURE — 80053 COMPREHEN METABOLIC PANEL: CPT | Performed by: EMERGENCY MEDICINE

## 2021-02-23 PROCEDURE — 258N000003 HC RX IP 258 OP 636: Performed by: EMERGENCY MEDICINE

## 2021-02-23 PROCEDURE — 96374 THER/PROPH/DIAG INJ IV PUSH: CPT

## 2021-02-23 PROCEDURE — 85379 FIBRIN DEGRADATION QUANT: CPT | Performed by: EMERGENCY MEDICINE

## 2021-02-23 RX ORDER — KETOROLAC TROMETHAMINE 15 MG/ML
15 INJECTION, SOLUTION INTRAMUSCULAR; INTRAVENOUS ONCE
Status: COMPLETED | OUTPATIENT
Start: 2021-02-23 | End: 2021-02-23

## 2021-02-23 RX ADMIN — SODIUM CHLORIDE 1000 ML: 9 INJECTION, SOLUTION INTRAVENOUS at 08:43

## 2021-02-23 RX ADMIN — KETOROLAC TROMETHAMINE 15 MG: 15 INJECTION, SOLUTION INTRAMUSCULAR; INTRAVENOUS at 08:44

## 2021-02-23 ASSESSMENT — ENCOUNTER SYMPTOMS
CONSTIPATION: 0
NAUSEA: 0
VOMITING: 0
APPETITE CHANGE: 0
PALPITATIONS: 0
ABDOMINAL PAIN: 0
SHORTNESS OF BREATH: 0
DIARRHEA: 0

## 2021-02-23 ASSESSMENT — MIFFLIN-ST. JEOR: SCORE: 1375.5

## 2021-02-23 NOTE — TELEPHONE ENCOUNTER
Patient currently being evaluated at ED for chest pain, see tadoÂ° message for more information    José Antonio Guzmán RN

## 2021-02-23 NOTE — ED PROVIDER NOTES
History   Chief Complaint  Chest Pain    HPI  Sola Walker is a 20 year old female who presents for evaluation of sharp chest pain that is located under her right breast. The patient reports that she woke up yesterday morning with this pain and that it has been relatively constant since, prompting her to take Advil and Tylenol, as well as use heat, without any relief. The pain is only really present with taking a deep breath, coughing, and sneezing. She has never had pain like this before. She was somewhat concerned about the timing of this pain as its onset was a few hours after arriving home, via plane, from [place]. She denies any other symptoms, including shortness of breath, palpitations, abdominal pain, diarrhea, constipation, nausea, vomiting, or decrease in appetite. Her last menstrual period was February 15th and she is currently on Megan.     PE/DVT RISK FACTORS:  Sex:    female  Hormones:   positive  Tobacco:   negative  Cancer:   negative  Travel:   positive  Surgery:   negative  Other immobilization: negative  Personal history:  negative  Family history:  negative    Review of Systems   Constitutional: Negative for appetite change.   Respiratory: Negative for shortness of breath.    Cardiovascular: Positive for chest pain. Negative for palpitations.   Gastrointestinal: Negative for abdominal pain, constipation, diarrhea, nausea and vomiting.   All other systems reviewed and are negative.    Allergies  The patient has no known allergies.     Medications  Megan  Maxalt  Zofran PRN    Past Medical History  Mononucleosis  Migraine  Polycystic ovarian syndrome    Past Surgical History  Perry teeth extraction    Family History  Hypertension  Kidney disease  GERD  Denies any family history of blood clots or clotting disorders.    Social History  Presents to the ED alone.   The patient is .   Negative for tobacco use.  Alcohol: occasional    Negative for drug use.    Physical Exam     Patient  "Vitals for the past 24 hrs:   BP Temp Temp src Pulse Resp SpO2 Height Weight   02/23/21 1100 (!) 123/90 -- -- 72 -- 98 % -- --   02/23/21 0915 -- -- -- 75 14 100 % -- --   02/23/21 0900 -- -- -- 85 23 100 % -- --   02/23/21 0845 (!) 125/93 -- -- 77 9 100 % -- --   02/23/21 0830 -- -- -- 84 10 100 % -- --   02/23/21 0815 -- -- -- 98 22 99 % -- --   02/23/21 0753 (!) 159/95 97.6  F (36.4  C) Temporal 126 20 99 % 1.727 m (5' 8\") 56.7 kg (125 lb)     Physical Exam  General: Resting comfortably on the rney  Head:  The scalp, face, and head appear normal  Eyes:  The pupils are equal, round, and reactive to light    There is no nystagmus    Extraocular muscles are intact    Conjunctivae and sclerae are normal  ENT:    The nose is normal    Pinnae are normal    The oropharynx is normal    Uvula is in the midline  Neck:  Normal range of motion    There is no rigidity noted    There is no midline cervical spine pain/tenderness    Trachea is in the midline    No mass is detected  CV:  Regular rate and underlying rhythm     Normal S1/S2, no S3/S4    No pathological murmur detected  Resp:  Lungs are clear    There is no tachypnea    Non-labored    No rales    No wheezing   GI:  Abdomen is soft, there is no rigidity    No distension    No tympani    No rebound tenderness     Non-surgical without peritoneal features  MS:  Normal muscular tone    Symmetric motor strength    No major joint effusions    No asymmetric leg swelling, no calf tenderness  Skin:  No rash or acute skin lesions noted  Neuro: Speech is normal and fluent  Psych:  Awake. Alert.      Normal affect.  Appropriate interactions.  Lymph: No anterior cervical lymphadenopathy noted    Emergency Department Course   ECG:   ECG taken at 0753, ECG read at 0804  Sinus tachycardia. Possible left atrial enlargement. ST and T wave abnormality, consider inferior ischemia. Abnormal ECG.   Rate 122 bpm. RI interval 128 ms. QRS duration 88 ms. QT/QTc 328/467 ms. P-R-T axes 68 67 " -15.    Imaging:  XR Chest 2 views:   PA and lateral views of the chest were obtained. Cardiomediastinal silhouette is within normal limits. Few small scattered nodular pulmonary opacities, likely represent calcified pulmonary granulomas. No suspicious focal pulmonary opacities. No significant pleural effusion or pneumothorax.  As per radiology.     Laboratory:  CBC: WBC: 5.3, HGB: 13.5, PLT: 212  CMP: Glucose 113 (H), o/w WNL (Creatinine: 0.78)  0810 Troponin I: <0.015  D dimer: <0.3  ISTAT HCG Quantitative Pregnancy POCT: <5.0    Emergency Department Course:  Reviewed:  I reviewed nursing notes and vitals    Assessments:  0835 I physically examined the patient as documented above.    1020 I rechecked the patient and updated her on the findings of her workup.     Interventions:  0843 NS 1L IV  0844 Toradol 15 mg IV    Disposition:  The patient was discharged to home.     Impression & Plan   Medical Decision Making:  This patient presents with an episode of right sided chest discomfort as noted above.  On clinical exam there are no abnormal findings.  Chest x-rays shows some old scattered pulmonary granulomata.  There is no evidence of pneumothorax or pneumonia.  Screening troponin and D-dimer are both undetectable making cardiac ischemia, myocardial infarction, pulmonary embolism unlikely.  This patient is low risk at presentation.  She is normally healthy.  Her risk factor is a brief amount of airline travel and the use of an estrogen-containing oral contraceptive medication.  She has a normal heart rate at this time, she is not hypoxic, there is no dyspnea, no hemoptysis, no kate severe pleuritic chest pain, no leg swelling.  Patient advised that this is an atypical type of chest pain.  Could be myofascial or could be a pleurisy based pain.  There is no further work-up is indicated at this time.  In the risk-benefit equation there is no indication for CT scan of the chest at this juncture.  A broad differential  diagnosis is entertained but no life-threatening etiologies are suspected at this time.    Diagnosis:    ICD-10-CM    1. Chest pain, unspecified type  R07.9      Scribe Disclosure:  I, Colin Altman, am serving as a scribe at 8:01 AM on 2/23/2021 to document services personally performed by Benedicto Wynne MD based on my observations and the provider's statements to me.      Benedicto Wynne MD  02/23/21 161

## 2021-02-23 NOTE — ED TRIAGE NOTES
Pt complains of chest pain under her right breast rated 6/10 that is worse with deep breaths and coughing.  Pt reports she is on BC and recently flew back from [place] lasty Sunday.

## 2021-03-04 ENCOUNTER — IMMUNIZATION (OUTPATIENT)
Dept: NURSING | Facility: CLINIC | Age: 21
End: 2021-03-04
Attending: NURSE PRACTITIONER
Payer: COMMERCIAL

## 2021-03-04 PROCEDURE — 0002A PR COVID VAC PFIZER DIL RECON 30 MCG/0.3 ML IM: CPT

## 2021-03-04 PROCEDURE — 91300 PR COVID VAC PFIZER DIL RECON 30 MCG/0.3 ML IM: CPT

## 2021-03-12 ENCOUNTER — TRANSFERRED RECORDS (OUTPATIENT)
Dept: HEALTH INFORMATION MANAGEMENT | Facility: CLINIC | Age: 21
End: 2021-03-12

## 2021-03-12 ENCOUNTER — OFFICE VISIT (OUTPATIENT)
Dept: CARDIOLOGY | Facility: CLINIC | Age: 21
End: 2021-03-12
Payer: COMMERCIAL

## 2021-03-12 VITALS
WEIGHT: 125 LBS | SYSTOLIC BLOOD PRESSURE: 130 MMHG | BODY MASS INDEX: 18.94 KG/M2 | HEIGHT: 68 IN | DIASTOLIC BLOOD PRESSURE: 80 MMHG | HEART RATE: 110 BPM

## 2021-03-12 DIAGNOSIS — I47.10 PAROXYSMAL SUPRAVENTRICULAR TACHYCARDIA (H): ICD-10-CM

## 2021-03-12 DIAGNOSIS — I47.10 PAROXYSMAL SUPRAVENTRICULAR TACHYCARDIA (H): Primary | ICD-10-CM

## 2021-03-12 DIAGNOSIS — R42 DIZZINESS: ICD-10-CM

## 2021-03-12 LAB — TSH SERPL DL<=0.005 MIU/L-ACNC: 1.27 MU/L (ref 0.4–4)

## 2021-03-12 PROCEDURE — 99203 OFFICE O/P NEW LOW 30 MIN: CPT | Mod: 25 | Performed by: INTERNAL MEDICINE

## 2021-03-12 PROCEDURE — 84443 ASSAY THYROID STIM HORMONE: CPT | Performed by: INTERNAL MEDICINE

## 2021-03-12 PROCEDURE — 93000 ELECTROCARDIOGRAM COMPLETE: CPT | Performed by: INTERNAL MEDICINE

## 2021-03-12 PROCEDURE — 36415 COLL VENOUS BLD VENIPUNCTURE: CPT | Performed by: INTERNAL MEDICINE

## 2021-03-12 RX ORDER — ATENOLOL 100 MG/1
50 TABLET ORAL DAILY
Qty: 15 TABLET | Refills: 3 | Status: SHIPPED | OUTPATIENT
Start: 2021-03-12 | End: 2021-08-11

## 2021-03-12 ASSESSMENT — MIFFLIN-ST. JEOR: SCORE: 1375.5

## 2021-03-12 NOTE — PROGRESS NOTES
HPI and Plan:   See dictation    Orders Placed This Encounter   Procedures     TSH with free T4 reflex     Follow-Up with Electrophysiologist     EKG 12-lead complete w/read - Clinics (performed today)     Echocardiogram Complete       Orders Placed This Encounter   Medications     atenolol (TENORMIN) 100 MG tablet     Sig: Take 0.5 tablets (50 mg) by mouth daily     Dispense:  15 tablet     Refill:  3       Encounter Diagnoses   Name Primary?     Paroxysmal supraventricular tachycardia (H) Yes     Dizziness        CURRENT MEDICATIONS:  Current Outpatient Medications   Medication Sig Dispense Refill     atenolol (TENORMIN) 100 MG tablet Take 0.5 tablets (50 mg) by mouth daily 15 tablet 3     drospirenone-ethinyl estradiol (LAUREEN) 3-0.03 MG tablet Take 1 tablet by mouth daily 84 tablet 3     ondansetron (ZOFRAN) 4 MG tablet Take 1 tablet (4 mg) by mouth every 8 hours as needed for nausea 30 tablet 1     rizatriptan (MAXALT) 5 MG tablet Take 1 tablet (5 mg) by mouth at onset of headache for migraine May repeat in 2 hours. Max 6 tablets/24 hours. 18 tablet 1     Vitamin D3 (CHOLECALCIFEROL) 25 mcg (1000 units) tablet        rizatriptan (MAXALT) 5 MG tablet Take 5 mg by mouth         ALLERGIES   No Known Allergies    PAST MEDICAL HISTORY:  Past Medical History:   Diagnosis Date     Migraine      Mononucleosis        PAST SURGICAL HISTORY:  Past Surgical History:   Procedure Laterality Date     wisdom teeth         FAMILY HISTORY:  Family History   Problem Relation Age of Onset     Familial Adenomatous Polyposis (FAP) Mother      Arthritis Mother      Familial Adenomatous Polyposis (FAP) Father      Hypertension Father      Kidney Disease Father      GERD Father      Cancer Maternal Grandmother 72        lung cancer     Thyroid Disease Maternal Grandfather        SOCIAL HISTORY:  Social History     Socioeconomic History     Marital status:      Spouse name: None     Number of children: None     Years of education:  "None     Highest education level: None   Occupational History     None   Social Needs     Financial resource strain: None     Food insecurity     Worry: None     Inability: None     Transportation needs     Medical: None     Non-medical: None   Tobacco Use     Smoking status: Never Smoker     Smokeless tobacco: Never Used   Substance and Sexual Activity     Alcohol use: Yes     Alcohol/week: 0.0 standard drinks     Comment: 2-3 month      Drug use: No     Sexual activity: Yes     Partners: Male   Lifestyle     Physical activity     Days per week: None     Minutes per session: None     Stress: None   Relationships     Social connections     Talks on phone: None     Gets together: None     Attends [employer]ical service: None     Active member of club or organization: None     Attends meetings of clubs or organizations: None     Relationship status: None     Intimate partner violence     Fear of current or ex partner: None     Emotionally abused: None     Physically abused: None     Forced sexual activity: None   Other Topics Concern     Parent/sibling w/ CABG, MI or angioplasty before 65F 55M? No   Social History Narrative     None       Review of Systems:  Skin:  Negative     Eyes:  Negative    ENT:  Negative    Respiratory:  Negative for shortness of breath  Cardiovascular:    Positive for;dizziness;palpitations  Gastroenterology: Negative    Genitourinary:  Negative    Musculoskeletal:  Negative    Neurologic:  Positive for migraine headaches  Psychiatric:  Negative    Heme/Lymph/Imm:  Negative    Endocrine:  Negative      Physical Exam:  Vitals: /80   Pulse 110   Ht 1.727 m (5' 8\")   Wt 56.7 kg (125 lb)   LMP 02/15/2021   BMI 19.01 kg/m      Constitutional:  cooperative, alert and oriented, well developed, well nourished, in no acute distress        Skin:  warm and dry to the touch, no apparent skin lesions or masses noted          Head:  normocephalic, no masses or lesions        Eyes:  pupils equal and " round, conjunctivae and lids unremarkable, sclera white, no xanthalasma, EOMS intact, no nystagmus        Lymph:No Cervical lymphadenopathy present     ENT:  no pallor or cyanosis, dentition good        Neck:  carotid pulses are full and equal bilaterally, JVP normal, no carotid bruit        Respiratory:  normal breath sounds, clear to auscultation, normal A-P diameter, normal symmetry, normal respiratory excursion, no use of accessory muscles         Cardiac: regular rhythm, normal S1/S2, no S3 or S4, apical impulse not displaced, no murmurs, gallops or rubs                pulses full and equal, no bruits auscultated                                        GI:  abdomen soft, non-tender, BS normoactive, no mass, no HSM, no bruits        Extremities and Muscular Skeletal:  no deformities, clubbing, cyanosis, erythema observed              Neurological:  no gross motor deficits        Psych:  Alert and Oriented x 3        Recent Lab Results:  LIPID RESULTS:  Lab Results   Component Value Date    CHOL 258 (H) 01/28/2021     01/28/2021     (H) 01/28/2021    TRIG 112 01/28/2021       LIVER ENZYME RESULTS:  Lab Results   Component Value Date    AST 11 02/23/2021    ALT 19 02/23/2021       CBC RESULTS:  Lab Results   Component Value Date    WBC 5.3 02/23/2021    RBC 4.52 02/23/2021    HGB 13.5 02/23/2021    HCT 39.2 02/23/2021    MCV 87 02/23/2021    MCH 29.9 02/23/2021    MCHC 34.4 02/23/2021    RDW 11.7 02/23/2021     02/23/2021       BMP RESULTS:  Lab Results   Component Value Date     02/23/2021    POTASSIUM 3.5 02/23/2021    CHLORIDE 107 02/23/2021    CO2 23 02/23/2021    ANIONGAP 7 02/23/2021     (H) 02/23/2021    BUN 15 02/23/2021    CR 0.78 02/23/2021    GFRESTIMATED >90 02/23/2021    GFRESTBLACK >90 02/23/2021    FLYNN 8.9 02/23/2021        A1C RESULTS:  No results found for: A1C    INR RESULTS:  No results found for: INR        CC  No referring provider defined for this  encounter.

## 2021-03-12 NOTE — LETTER
3/12/2021    Elisa Craven MD  [address] [facility] [address]    RE: Sola Walker       Dear Colleague,    I had the pleasure of seeing Sola Walker in the [facility].    HPI and Plan:   See dictation    Orders Placed This Encounter   Procedures     TSH with free T4 reflex     Follow-Up with Electrophysiologist     EKG 12-lead complete w/read - Clinics (performed today)     Echocardiogram Complete       Orders Placed This Encounter   Medications     atenolol (TENORMIN) 100 MG tablet     Sig: Take 0.5 tablets (50 mg) by mouth daily     Dispense:  15 tablet     Refill:  3       Encounter Diagnoses   Name Primary?     Paroxysmal supraventricular tachycardia (H) Yes     Dizziness        CURRENT MEDICATIONS:  Current Outpatient Medications   Medication Sig Dispense Refill     atenolol (TENORMIN) 100 MG tablet Take 0.5 tablets (50 mg) by mouth daily 15 tablet 3     drospirenone-ethinyl estradiol (LAUREEN) 3-0.03 MG tablet Take 1 tablet by mouth daily 84 tablet 3     ondansetron (ZOFRAN) 4 MG tablet Take 1 tablet (4 mg) by mouth every 8 hours as needed for nausea 30 tablet 1     rizatriptan (MAXALT) 5 MG tablet Take 1 tablet (5 mg) by mouth at onset of headache for migraine May repeat in 2 hours. Max 6 tablets/24 hours. 18 tablet 1     Vitamin D3 (CHOLECALCIFEROL) 25 mcg (1000 units) tablet        rizatriptan (MAXALT) 5 MG tablet Take 5 mg by mouth         ALLERGIES   No Known Allergies    PAST MEDICAL HISTORY:  Past Medical History:   Diagnosis Date     Migraine      Mononucleosis        PAST SURGICAL HISTORY:  Past Surgical History:   Procedure Laterality Date     wisdom teeth         FAMILY HISTORY:  Family History   Problem Relation Age of Onset     Familial Adenomatous Polyposis (FAP) Mother      Arthritis Mother      Familial Adenomatous Polyposis (FAP) Father      Hypertension Father      Kidney Disease Father      GERD Father       "Cancer Maternal Grandmother 72        lung cancer     Thyroid Disease Maternal Grandfather        SOCIAL HISTORY:  Social History     Socioeconomic History     Marital status:      Spouse name: None     Number of children: None     Years of education: None     Highest education level: None   Occupational History     None   Social Needs     Financial resource strain: None     Food insecurity     Worry: None     Inability: None     Transportation needs     Medical: None     Non-medical: None   Tobacco Use     Smoking status: Never Smoker     Smokeless tobacco: Never Used   Substance and Sexual Activity     Alcohol use: Yes     Alcohol/week: 0.0 standard drinks     Comment: 2-3 month      Drug use: No     Sexual activity: Yes     Partners: Male   Lifestyle     Physical activity     Days per week: None     Minutes per session: None     Stress: None   Relationships     Social connections     Talks on phone: None     Gets together: None     Attends Jewish service: None     Active member of club or organization: None     Attends meetings of clubs or organizations: None     Relationship status: None     Intimate partner violence     Fear of current or ex partner: None     Emotionally abused: None     Physically abused: None     Forced sexual activity: None   Other Topics Concern     Parent/sibling w/ CABG, MI or angioplasty before 65F 55M? No   Social History Narrative     None       Review of Systems:  Skin:  Negative     Eyes:  Negative    ENT:  Negative    Respiratory:  Negative for shortness of breath  Cardiovascular:    Positive for;dizziness;palpitations  Gastroenterology: Negative    Genitourinary:  Negative    Musculoskeletal:  Negative    Neurologic:  Positive for migraine headaches  Psychiatric:  Negative    Heme/Lymph/Imm:  Negative    Endocrine:  Negative      Physical Exam:  Vitals: /80   Pulse 110   Ht 1.727 m (5' 8\")   Wt 56.7 kg (125 lb)   LMP 02/15/2021   BMI 19.01 kg/m  "     Constitutional:  cooperative, alert and oriented, well developed, well nourished, in no acute distress        Skin:  warm and dry to the touch, no apparent skin lesions or masses noted          Head:  normocephalic, no masses or lesions        Eyes:  pupils equal and round, conjunctivae and lids unremarkable, sclera white, no xanthalasma, EOMS intact, no nystagmus        Lymph:No Cervical lymphadenopathy present     ENT:  no pallor or cyanosis, dentition good        Neck:  carotid pulses are full and equal bilaterally, JVP normal, no carotid bruit        Respiratory:  normal breath sounds, clear to auscultation, normal A-P diameter, normal symmetry, normal respiratory excursion, no use of accessory muscles         Cardiac: regular rhythm, normal S1/S2, no S3 or S4, apical impulse not displaced, no murmurs, gallops or rubs                pulses full and equal, no bruits auscultated                                        GI:  abdomen soft, non-tender, BS normoactive, no mass, no HSM, no bruits        Extremities and Muscular Skeletal:  no deformities, clubbing, cyanosis, erythema observed              Neurological:  no gross motor deficits        Psych:  Alert and Oriented x 3        Recent Lab Results:  LIPID RESULTS:  Lab Results   Component Value Date    CHOL 258 (H) 01/28/2021     01/28/2021     (H) 01/28/2021    TRIG 112 01/28/2021       LIVER ENZYME RESULTS:  Lab Results   Component Value Date    AST 11 02/23/2021    ALT 19 02/23/2021       CBC RESULTS:  Lab Results   Component Value Date    WBC 5.3 02/23/2021    RBC 4.52 02/23/2021    HGB 13.5 02/23/2021    HCT 39.2 02/23/2021    MCV 87 02/23/2021    MCH 29.9 02/23/2021    MCHC 34.4 02/23/2021    RDW 11.7 02/23/2021     02/23/2021       BMP RESULTS:  Lab Results   Component Value Date     02/23/2021    POTASSIUM 3.5 02/23/2021    CHLORIDE 107 02/23/2021    CO2 23 02/23/2021    ANIONGAP 7 02/23/2021     (H) 02/23/2021    BUN  15 02/23/2021    CR 0.78 02/23/2021    GFRESTIMATED >90 02/23/2021    GFRESTBLACK >90 02/23/2021    FLYNN 8.9 02/23/2021        A1C RESULTS:  No results found for: A1C    INR RESULTS:  No results found for: INR    Thank you for allowing me to participate in the care of your patient.      Sincerely,     DR JIMMY MAYO MD     [facility]    cc:   No referring provider defined for this encounter.

## 2021-03-12 NOTE — PROGRESS NOTES
Service Date: 2021      CLINIC NOTE       HISTORY OF PRESENT ILLNESS:  It is a pleasure to see Sola today for evaluation of arrhythmia.      She has a rhythm strip with her, which shows a PSVT at about 180-190 beats per minute.  The strip also shows conversion to sinus rhythm.      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  She has never had any palpitations before.  She suddenly felt unwell when seeing patients.  She was very dizzy.  Heart rate was very high.  A rhythm strip was obtained.  The anesthetist at [facility] did a carotid sinus massage, which I think broke the rhythm.  The whole episode lasted about 8 minutes.  Surface EKG done today was normal.  No history of drug, alcohol or tobacco abuse.  She does not use caffeinated products, aside from one cup of coffee a day.      Physically active.  Keen .  No exertional shortness of breath or chest pain.  No history of syncope.  Grandmother had PSVT.  No family history of sudden death.  Cardiac exam unremarkable.        A young lady with PSVT.  We discussed what this condition is.  We talked about Valsalva maneuver and potentially performing carotid sinus massage herself.  I also prescribed her beta blockers to take p.r.n. if the arrhythmia is lasting.  Discussed ablation.  We will refer her to my EP colleagues for further evaluation.      She was seen in the emergency room recently with pleuritic chest discomfort after traveling.  A PE has been ruled out.  Labs there are essentially within normal limits.  I will check her TSH today.         JIMMY MAYO MD, [facility]             D: 2021   T: 2021   MT: mathew      Name:     SOLA CATALAN   MRN:      [id number]        Account:      [id number]   :      2000           Service Date: 2021      Document: [id number]

## 2021-03-19 ENCOUNTER — TELEPHONE (OUTPATIENT)
Dept: CARDIOLOGY | Facility: CLINIC | Age: 21
End: 2021-03-19

## 2021-03-19 NOTE — TELEPHONE ENCOUNTER
Patient was at work today ([facility]) and felt dizzy and had an episode of SVT the nurse anesthetist did carotid massage and the episode lasted 5-8 minutes.  Is concerned about driving the episodes are coming with no warning.   She is wondering if she should have a holter prior to visit with EP Echo on 3-30 and EP on 3-31 with Dr. Ortega.  Will make sure she is well hydrated and will forward to Dr. Calderon if holter is needed prior to OV?

## 2021-03-22 NOTE — TELEPHONE ENCOUNTER
Ip, MD Dom Lowry Karin, RN 1 hour ago (8:01 AM)       We have rhythm strip already, so no need.   Thanks.        Left voice message on patient's personal cell phone with above response to message received last week.  Call back phone number left, should she have further questions or concerns.

## 2021-03-29 ENCOUNTER — TELEPHONE (OUTPATIENT)
Dept: CARDIOLOGY | Facility: CLINIC | Age: 21
End: 2021-03-29

## 2021-03-29 NOTE — TELEPHONE ENCOUNTER
----- Message from Perry Espinosa MD sent at 3/29/2021  3:14 PM CDT -----  I know we have a copy of her svt in epic but I would like her to bring the original with her when she sees me this wed. Thanks, qp

## 2021-03-29 NOTE — TELEPHONE ENCOUNTER
Update from [facility]  patient to cancel Echo quarantine for 14 days from [place] 3-28-21 and was given scheduling number for both Echo and EP visit.   Patient verbalized understanding and agreed to plan of care. Due to + Covid exposure      Patient states she is scheduled for an Echo tomorrow and was notified today a person who she visited over the weekend tested + for Covid.  She was with the person for about 2 hours HIgh exposure did not touch her and person had cold symptoms.  She has been vaccinated 2nd dose over 2 1/2 weeks ago.  Should she come in for Echo tomorrow.  Contacted Lead RN today with the following CDC guidelines.  Plus patient will take her temperature tomorrow if afebrile will have test.  The [place] Department of Health and the Centers for Disease Control and Prevention updated their guidance for healthcare workers with a high-risk COVID-19 exposure ([employer] and [employer]OURCE). This updated guidance only applies to healthcare workers who are fully vaccinated, meaning two weeks has passed since their final COVID-19 vaccine dose (second dose in a two-dose series or one dose in a one-dose series). Please review the following:          Fully vaccinated and asymptomatic healthcare workers with a known high-risk exposure (at work or in the community) do not need to quarantine from work or the community for 14 days following the exposure.              Healthcare workers should still get tested 5-7 days after exposure.       If signs or symptoms develop at any time in the 14 days following exposure, the healthcare worker should get tested and isolate at home.     Quarantine from work for 14 days should still be considered for:              Healthcare workers who have underlying immunocompromising conditions (e.g. organ transplant, cancer treatment) which might impact the level of protection provided by the COVID-19 vaccine. Data on which immunocompromising conditions might affect response to  the COVID-19 vaccine, and the magnitude of risk, are not available.     Healthcare workers in situations where the [place] Department of [facility] has identified that the high-risk exposure might be associated with a SARS CoV-2 variant of concern. The Centers for Disease Control and Prevention continues to evaluate the impact of vaccination and the emergence of novel SARS-CoV-2 variants on the healthcare infection prevention and control recommendations.    Left detailed message for patient and to call back if any other questions or concerns.

## 2021-06-02 NOTE — PROGRESS NOTES
FEMALE ADULT PREVENTIVE EXAM    CHIEF COMPLAINT:  Female preventive exam.    SUBJECTIVE:  Sola Bowles is a 19 y.o. female who presents for her routine physical exam.    Patient would like to address the following concerns today: Patient, she is a nurse at [employer], she is here to establish care and to address a couple of concern, back pain has been chronic for the past few years, mid to lower back, no radiation to her legs.  Seems to be improving with over-the-counter analgesic but no previous work-up recent exacerbation, with no radiculopathy..  Migraine headaches, does run in family, she does have 2-3 episodes per weeks, usually 1 or 2 migraine headaches a month.  OTC analgesic dose seems to help but not totally.  Sister and mom has migraine headaches and has been treated with triptan's.  Migraine is described as throbbing frontal headaches, no specific trigger, she usually knows that she is getting a migraine when her speech getting weird.    GYNE HISTORY  Menses: yes  Sexually Active: yes  Contraception: ocp  Last Pap: 2019 at the Gyn office  Abnormal Pap: no  Vaginal Discharge: no      She  has no past medical history on file.    Lab Results   Component Value Date     10/30/2019    K 3.7 10/30/2019    BUN 16 10/30/2019     Lab Results   Component Value Date    CHOL 246 (H) 10/30/2019     10/30/2019    LDLCALC 129 10/30/2019    TRIG 77 10/30/2019     No results found for: TSH  BP Readings from Last 3 Encounters:   10/30/19 115/62       Surgeries:  No past surgical history on file.    Family History:  Her family history is not on file.    Social History:  She  reports that she has never smoked. She has never used smokeless tobacco. She reports current alcohol use. She reports that she does not use drugs.    Medications:    Current Outpatient Medications:      drospirenone-ethinyl estradiol (LAUREEN) 3-0.03 mg per tablet, Take 1 tablet by mouth daily., Disp: , Rfl:      rizatriptan  (MAXALT) 5 MG tablet, Take 1 tablet (5 mg total) by mouth as needed for migraine. May repeat in 2 hours if needed, Disp: 10 tablet, Rfl: 3  HELD MEDICATIONS: None.    Allergies:  No latex allergies.  No Known Allergies         RISK BEHAVIOR & HEALTH HABITS  Self Breast Exam: yes.  Regular Exercise: yes.  Balanced diet: yes.  Seat Belt Use: yes  Calcium intake/Osteoporosis prevention: yes.    Guns: NA  Sun Screen: YES  Dental Care: YES    REVIEW OF SYSTEMS:  Complete head to toe review of systems is otherwise negative except as above.    OBJECTIVE:  VITAL SIGNS:    Vitals:    10/30/19 1100   BP: 115/62   Pulse: 77   SpO2: 98%     GENERAL:  Patient alert, in no acute distress.  EYES: PERRLA. Extraoccular movements intact, pupils equal, reactive to light and accommodation.  Normal conjunctiva and lids.    ENT:  Hearing grossly normal.  Normal appearance to ears and nose.  Bilateral TM s, external canals, oropharynx normal. Normal lips, gums and teeth.    NECK:  Supple, without thyromegaly or mass, no adenopathies.  RESP:  Clear to auscultation without crackles, wheezes or distress.  Normal respiratory effort.   CV:  Regular rate and rhythm without murmurs, rubs or gallops.  Normal pedal pulses.  No varicosities or edema.  ABDOMEN:  Soft, non-tender, without hepatosplenomegaly, masses, or hernias.   BREASTS:  deferred    :   deferred  Rectal: deferred  LYMPHATIC: Normal palpation of neck.  No lymphadenopathy.  No bruising.  NEURO:  CN II-XII intact, motor & sensory function all intact.  DTR and reflexes normal.  PSYCHIATRIC:  Alert & oriented with normal mood and affect.  Good judgment and insight.  SKIN:  Normal inspection and palpation.  MUSCULOSKELETAL: Normal gait and station.  - Spine / Ribs / Pelvis: Normal inspection, ROM, stability and strength: Spine, Head, Neck, Upper and Lower Extremities.    ASSESSMENT & PLAN  [facility] was seen today for establish care, annual exam, back pain and headache.    Diagnoses and  all orders for this visit:    Visit for preventive health examination    Chronic low back pain without sciatica, unspecified back pain laterality  -     CCP Antibodies  -     Uric Acid  -     Rheumatoid Factor Quant  -     C-Reactive Protein  -     Antinuclear Antibodies Screen (ANA LUISA)  -     HLA-B27 Typing  -     Lyme Antibody [place]  -     Comprehensive Metabolic Panel  -     XR Scoliosis AP Standing; Future  -     XR Lumbar Spine 2 or 3 VWS; Future  -     Erythrocyte Sedimentation Rate    Need for influenza vaccination  -     Influenza, Seasonal Quad, PF =/> 6months    Migraine with aura and without status migrainosus, not intractable  -     Comprehensive Metabolic Panel  -     rizatriptan (MAXALT) 5 MG tablet; Take 1 tablet (5 mg total) by mouth as needed for migraine. May repeat in 2 hours if needed  -     Erythrocyte Sedimentation Rate    Screening for HIV without presence of risk factors  -     HIV Antigen/Antibody Screening [place]    Special screening examination for chlamydial disease  -     Chlamydia trachomatis & Neisseria gonorrhoeae, Amplified Detection    Screening for endocrine, nutritional, metabolic and immunity disorder  -     Lipid Cascade    Chronic low back pain, differential diagnosis discussed included connective tissue disorder, musculoskeletal pain, etc. we are getting an x-ray, laboratory works.  Migraine headaches, treatment discussed with healthy lifestyle changes, Maxalt as needed, consider referral to neurologist if not improving.    General  Immunizations reviewed and updated .  Alcohol use, safety and moderation discussed.  Recommended adequate calcium, vitamin D intake/osteoporosis prevention.  Discussed colon cancer screening at age 50, 45 if -American.  Diet and exercise reviewed, including goal of aerobic exercise 30-90 minutes most days of the week, moderation of portions sizes, avoiding eating out and fast food and increase in fruits and vegetables.  Discussed safe sex  practices.    Discussed & recommended seat belt (& motorcycle helmet) use.  Discussed & recommended smoke detector.  Discussed sun protection.  Discussed weight management.  Discussed self breast exam, screening mammogram timing.  I have had an Advance Directives discussion with the patient.  The following high BMI interventions were performed this visit: encouragement to exercise    Danielle Gabriel MD

## 2021-06-03 VITALS
HEART RATE: 77 BPM | OXYGEN SATURATION: 98 % | HEIGHT: 68 IN | BODY MASS INDEX: 18.79 KG/M2 | SYSTOLIC BLOOD PRESSURE: 115 MMHG | DIASTOLIC BLOOD PRESSURE: 62 MMHG | WEIGHT: 124 LBS

## 2021-06-03 NOTE — PROGRESS NOTES
[facility] Daily Progress/Discharge Summary     Patient Name: Sola Bowles  Date: 12/4/2019  Visit #: 2  PTA visit #:    Date of evaluation: 11/12/2019  Referral Diagnosis: Chronic low back pain without sciatica, unspecified back pain laterality  Referring provider: Danielle Gabriel *  Visit Diagnosis:     ICD-10-CM    1. Chronic low back pain without sciatica, unspecified back pain laterality M54.5     G89.29    2. Muscle weakness (generalized) M62.81      Initial Assessment    Sola Bowles is a 19 y.o. female who presents to therapy today with chief complaints of chronic low back pain that started more than 5 years ago but seems to have increased in the last year. Pain limits her from activities such as tennis, weight lifting, bending and lifting for home and work activities. Patient is limited in flexion, extension R > L side bending with stiffness and pain in the low back. Patient will benefit from skilled therapy to increase ROM, decrease pain and inflammation in order to improve function, work activities and return to normal activities    Assessment:   Patient cancelled her last follow up appointment due to illness and then did not return to therapy. Patient was improved per her last now, see below for the last known status towards goals. Patient will be discharged from therapy.       HEP/POC compliance is  good .  Patient demonstrates understanding/independence with home program.    Goal Status:  Pt. will demonstrate/verbalize independence in self-management of condition in : 6 weeks  Pt. will be independent with home exercise program in : 6 weeks  Pt. will bend: to dress;to clean; 10#;for self care;with less difficulty;for work;for exercise/recreation;in 6 weeks  Patient will twist/turn : in bed;while standing;with less pain;with less difficulty;for bed mobilitiy;for cooking/cleaning;at work;in 6 weeks  Patient will return to: sport;exercise;in 6 weeks  Patient will  perform repetitive movement in : for work;with less pain;with less difficulty;in 6 weeks    Plan / Patient Education:     Continue with initial plan of care.  Progress with home program as tolerated.     Plan for next visit: manual therapy as needed, progress to add core and hip strengthening, work to increase ROM   Subjective:     Pain Ratin  Doing better, can tell when she does not do her stretches at home. Was able to do lunges at the gym again      Objective:     Lumbar ROM  - flexion to mid tibia, tight in back  - extension min dec, tight at end   - rotation min dec each way   - SB min dec to right with tight on L    HEP   - cat/cow (to neutral to avoid pain)   - 3 way tomy pose 30 sec hold  - thread the needle       Treatment Today   2019  TREATMENT MINUTES COMMENTS   Evaluation     Self-care/ Home management     Manual therapy 18 STM and MFR to the left QL and paraspinals   SL lumbar rotational mobs  PAs to mob lumbar spine in SL position    Neuromuscular Re-education     Therapeutic Activity     Therapeutic Exercises 10 Reviewed HEP as above  Added   - dynamic HS stretching x 3 cycles each side L3 band    Gait training     Modality__________________                Total 28    Blank areas are intentional and mean the treatment did not include these items.       Sejal Salinas  2019

## 2021-06-03 NOTE — PROGRESS NOTES
H  Rehabilitation   [facility]-Pelvic Initial Evaluation    Patient Name: Sola Bowles  Date of evaluation: 11/12/2019  Referral Diagnosis: Chronic low back pain without sciatica, unspecified back pain laterality  Referring provider: Danielle Gabriel *  Visit Diagnosis:     ICD-10-CM    1. Chronic low back pain without sciatica, unspecified back pain laterality M54.5     G89.29    2. Muscle weakness (generalized) M62.81        Assessment:     Sola Bowles is a 19 y.o. female who presents to therapy today with chief complaints of chronic low back pain that started more than 5 years ago but seems to have increased in the last year. Pain limits her from activities such as tennis, weight lifting, bending and lifting for home and work activities. Patient is limited in flexion, extension R > L side bending with stiffness and pain in the low back. Patient will benefit from skilled therapy to increase ROM, decrease pain and inflammation in order to improve function, work activities and return to normal activities.     Impairments in  pain, posture, ROM, joint mobility, strength  The POC is dynamic and will be modified on an ongoing basis.  Barriers to achieving goals as noted in the assessment section may affect outcome.  Prognosis to achieve goals is  good   Pt. is appropriate for skilled PT intervention as outlined in the Plan of Care (POC).  Pt. is a good candidate for skilled PT services to improve pain levels and function.    Goals:  Pt. will demonstrate/verbalize independence in self-management of condition in : 6 weeks  Pt. will be independent with home exercise program in : 6 weeks  Pt. will bend: to dress;to clean; 10#;for self care;with less difficulty;for work;for exercise/recreation;in 6 weeks  Patient will twist/turn : in bed;while standing;with less pain;with less difficulty;for bed mobilitiy;for cooking/cleaning;at work;in 6 weeks  Patient will return to: sport;exercise;in 6 weeks  Patient  will perform repetitive movement in : for work;with less pain;with less difficulty;in 6 weeks    No data recorded    Patient's expectations/goals are realistic.    Barriers to Learning or Achieving Goals:  Chronicity of the problem.  Co-morbidities or other medical factors.  none       Plan / Patient Instructions:        Plan of Care:   Authorization / Certification Start Date: 11/12/19  Communication with: Referral Source  Patient Related Instruction: Nature of Condition;Treatment plan and rationale;Self Care instruction;Basis of treatment;Body mechanics;Posture;Precautions;Next steps;Expected outcome  Times per Week: 1 time per week to every other week   Number of Weeks: 12 weeks  Number of Visits: 6-8 sessions   Discharge Planning: when goals have been met or a plateau in progress has been achieved   Therapeutic Exercise: ROM;Stretching;Strengthening  Neuromuscular Reeducation: kinesio tape;posture;TNE;core  Manual Therapy: soft tissue mobilization;myofascial release;joint mobilization;muscle energy  Modalities: electrical stimulation;TENS;ultrasound;cold pack;hot pack      Plan for next visit: manual therapy as needed, progress to add core and hip strengthening, work to increase ROM      Subjective:         Social information:   Living Situation:apartment, lives with others  and has assistance Yes   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 female who presents to therapy today with complaints of chronic low back pain that stated about 5 years ago and is progressively getting worse in the last year. No injuries to her back but did play competitively into college tennis. Cant play tennis now due to pain in the back.  Has pain with transitions at times, some pain and stiffness with rolling in bed, pain with extension and reaching back in tennis and other activities, she limits her workouts to  prevent flexion and squats, yoga increased her pain with flexion exercises. She works as a nurse and at times will have increased pain in her back after working and worried she will not be able to keep up and do her job due to the pain in the back.     Pain Ratin  Pain rating at best: 4  Pain rating at worst: 8  Pain description: aching and deep intense aching, pressure, vewry random times with sharp pain with flexion     Functional limitations are described as occurring with:   bending  lifting  repetitive movements  performing routine daily activities  sports or recreation tennis, weight lifting   transitional movements sit to stand, sit to supine and rolling in bed - stiffness  twisting or turning trunk    Patient reports benefit from:  movement or exercise , anti-inflammatory, pain medication, chiropractic care         Objective:      Note: Items left blank indicates the item was not performed or not indicated at the time of the evaluation.    Patient Outcome Measures :    Modified Oswestry Low Back Pain Disablity Questionnaire  in %: 32     Scores range from 0-100%, where a score of 0% represents minimal pain and maximal function. The minimal clinically important difference is a score reduction of 12%.    Examination  1. Chronic low back pain without sciatica, unspecified back pain laterality     2. Muscle weakness (generalized)       Involved side: Bilateral  Posture Observation:      Lumbopelvic complex: Mild scoliosis    Lumbar ROM:  2019  Date: 2019     *Indicate scale AROM AROM AROM   Lumbar Flexion To mid tibia, pain in the back     Lumbar Extension Mod dec pain      Right Left Right Left Right Left   Lumbar Sidebending Min dec Min dec pain R        Lumbar Rotation WNL tight  WNL tight        Thoracic Flexion      Thoracic Extension      Thoracic Sidebending         Thoracic Rotation Min dec tight  Min dec tight          Lower Extremity Strength:   19 - able to heel and toe walk in  clinic generally strong and pain free with MMT the LEs.     Sensation    WNL     Palpation: tender to PAs with increase tone of the paraspinals of the lower lumbar spine, increased tone to the QL on right side     Lumbar Special Tests:     Lumbar Special Tests Right Left SI Tests Right  Left   Quadrant test   SI Compression     Straight leg raise - - SI Distraction     Crossover response - - POSH Test     Slump - - Sacral Thrust     Sit-up test  FADIR - -   Trunk extensor endurance test  Resisted Abduction - -   Prone instability test  Other: CANDACE - -   Pubic shotgun  Other:       Repeated Motion Testing:  Does not centralize  Does not peripheralize    Passive Mobility - Joint Integrity:  Hypomobile    Treatment Today   11/12/190  TREATMENT MINUTES COMMENTS   Evaluation 35 Education on POC and pathology in clinic today   Self-care/ Home management     Manual therapy     Neuromuscular Re-education     Therapeutic Activity     Therapeutic Exercises 23 Instructed, decmonstration and performed exercises in clinic, given for HEP  - cat/cow (to neutral to avoid pain) x 15 reps   - 3 way tomy pose 30 sec hold x 3 reps each position  - thread the needle 2 x 10 reps each way    Gait training     Modality__________________                Total 58    Blank areas are intentional and mean the treatment did not include these items.     PT Evaluation Code: (Please list factors)  Patient History/Comorbidities: as above   Examination: as above   Clinical Presentation: stable  Clinical Decision Making: low     Patient History/  Comorbidities Examination  (body structures and functions, activity limitations, and/or participation restrictions) Clinical Presentation Clinical Decision Making (Complexity)   No documented Comorbidities or personal factors 1-2 Elements Stable and/or uncomplicated Low   1-2 documented comorbidities or personal factor 3 Elements Evolving clinical presentation with changing characteristics Moderate   3-4  documented comorbidities or personal factors 4 or more Unstable and unpredictable High     Sejal Salinas, PT, DPT, CLT  11/12/2019  4:06 PM

## 2021-06-17 NOTE — PATIENT INSTRUCTIONS - HE
Patient Instructions by Danielle Gabriel MD at 10/30/2019 11:00 AM     Author: Danielle Gabriel MD Service: -- Author Type: Physician    Filed: 11/1/2019  9:12 AM Encounter Date: 10/30/2019 Status: Signed    : Danielle Gabriel MD (Physician)       Patient Education     Prevention Guidelines, Women Ages 18 to 39  Screening tests and vaccines are an important part of managing your health. A screening test is done to find possible disorders or diseases in people who don't have any symptoms. The goal is to find a disease early so lifestyle changes can be made and you can be watched more closely to reduce the risk of disease, or to detect it early enough to treat it most effectively. Screening tests are not considered diagnostic, but are used to determine if more testing is needed. Health counseling is essential, too. Below are guidelines for these, for women ages 18 to 39. Talk with your healthcare provider to make sure youre up-to-date on what you need.  Screening Who needs it How often   Alcohol misuse All women in this age group At routine exams   Blood pressure All women in this age group Yearly checkup if your blood pressure is normal  Normal blood pressure is less than 120/80 mm Hg  If your blood pressure reading is higher than normal, follow the advice of your healthcare provider   Breast cancer All women in this age group should talk with their healthcare providers about the need for clinical breast exams (CBE)1 Clinical breast exam every 3 years1   Cervical cancer Women ages 21 and older Women between ages 21 and 29 should have a Pap test every 3 years; women between ages 30 and 65 are advised to have a Pap test plus an HPV test every 5 years   Chlamydia Sexually active women ages 25 and younger, and women at increased risk for infection (such as having multiple sex partners) Every year if you're at risk or have symptoms   Depression All women in this age group At routine exams    Type 2 diabetes, prediabetes All women with no symptoms who are overweight or obese and have 1 or more other risk factors for diabetes At least every 3 years. Also, testing for diabetes during pregnancy after the 24th week.    Type 2 diabetes, prediabetes All women diagnosed with gestational diabetes Lifelong testing every 3 years   Type 2 diabetes All women with prediabetes Every year   Gonorrhea Sexually active women at increased risk for infection At routine exams   Hepatitis C Anyone at increased risk At routine exams   HIV All women should be tested at least once for HIV between the ages of 13 and 64 At routine exams. Those with risk factors for HIV should be tested at least annually.    Obesity All women in this age group At routine exams   Syphilis Women at increased risk for infection should talk with their healthcare provider At routine exams   Tuberculosis Women at increased risk for infection should talk with their healthcare provider Ask your healthcare provider   Vision All women in this age group At least 1 complete exam in your 20s, and 2 in your 30s   Vaccine2 Who needs it How often   Chickenpox (varicella) All women in this age group who have no record of this infection or vaccine 2 doses; the second dose should be given 4 to 8 weeks after the first dose   Hepatitis A Women at increased risk for infection should talk with their healthcare provider 2 doses given at least 6 months apart   Hepatitis B Women at increased risk for infection should talk with their healthcare provider 3 doses over 6 months; second dose should be given 1 month after the first dose; the third dose should be given at least 2 months after the second dose and at least 4 months after the first dose   Haemophilus influenzae Type B (HIB) Women at increased risk for infection should talk with their healthcare provider 1 to 3 doses   Human papillomavirus (HPV) All women in this age group up to age 26 3 doses; the second dose  should be given 1 to 2 months after the first dose and the third dose given 6 months after the first dose   Influenza (flu) All women in this age group Once a year   Measles, mumps, rubella (MMR) All women in this age group who have no record of these infections or vaccines 1 or 2 doses   Meningococcal Women at increased risk for infection should talk with their healthcare provider 1 or more doses   Pneumococcal conjugate vaccine (PCV13) and pneumococcal polysaccharide vaccine (PPSV23) Women at increased risk for infection should talk with their healthcare provider PCV13: 1 dose ages 19 to 65 (protects against 13 types of pneumococcal bacteria)  PPSV23: 1 to 2 doses through age 64, or 1 dose at 65 or older (protects against 23 types of pneumococcal bacteria)      Tetanus/diphtheria/pertussis (Td/Tdap) booster All women in this age group Td every 10 years, or a one-time dose of Tdap instead of a Td booster after age 18, then Td every 10 years   Counseling Who needs it How often   BRCA gene mutation testing for breast and ovarian cancer susceptibility Women with increased risk for having gene mutation When your risk is known   Breast cancer and chemoprevention Women at high risk for breast cancer When your risk is known   Diet and exercise Women who are overweight or obese When diagnosed, and then at routine exams   Domestic violence Women at the age in which they are able to have children At routine exams   Sexually transmitted infection prevention Women who are sexually active At routine exams   Skin cancer Prevention of skin cancer in fair-skinned adults At routine exams   Use of tobacco and the health effects it can cause All women in this age group Every visit   1 According to the ACS, women ages 20 to 39 years should have a clinical breast exam (CBE) as part of their routine health exam every 3 years. Breast self-exams are an option for women starting in their 20s. But the USPSTF does not recommend CBE.  Date  Last Reviewed: 10/1/2017    6215-4914 [employer]. [address] [place] [address], [place] [address]. All rights reserved. This information is not intended as a substitute for professional medical care. Always follow your healthcare professional's instructions.

## 2021-08-11 ENCOUNTER — VIRTUAL VISIT (OUTPATIENT)
Dept: FAMILY MEDICINE | Facility: CLINIC | Age: 21
End: 2021-08-11
Payer: COMMERCIAL

## 2021-08-11 DIAGNOSIS — R11.0 NAUSEA: ICD-10-CM

## 2021-08-11 DIAGNOSIS — R19.7 DIARRHEA, UNSPECIFIED TYPE: Primary | ICD-10-CM

## 2021-08-11 PROCEDURE — 99213 OFFICE O/P EST LOW 20 MIN: CPT | Mod: GT | Performed by: INTERNAL MEDICINE

## 2021-08-11 RX ORDER — METOPROLOL SUCCINATE 50 MG/1
50 TABLET, EXTENDED RELEASE ORAL
COMMUNITY
Start: 2021-06-01 | End: 2022-04-25

## 2021-08-11 RX ORDER — ONDANSETRON 4 MG/1
4 TABLET, ORALLY DISINTEGRATING ORAL EVERY 8 HOURS PRN
Qty: 12 TABLET | Refills: 3 | Status: SHIPPED | OUTPATIENT
Start: 2021-08-11 | End: 2022-10-26

## 2021-08-11 NOTE — PROGRESS NOTES
Sola is a 21 year old who is being evaluated via a billable video visit.      How would you like to obtain your AVS? MyChart  If the video visit is dropped, the invitation should be resent by: Text to cell phone: [phone]  Will anyone else be joining your video visit? No    Video Start Time: 1:40 PM    Assessment & Plan     Diarrhea, unspecified type  Suspect viral enteritis  She has been checked for COVID  If I am right, symptoms should improve by this weekend  If symptoms persist into next week, then do stool tests as below  Discussed symptom management strategies  Discussed indications to go to ER for fluids  - Enteric Bacteria and Virus Panel by L[facility] Stool; Future  - Clostridium difficile Toxin B PCR; Future  - Ova and Parasite Exam Routine; Future    Nausea  Try below to help with oral intake   - ondansetron (ZOFRAN-ODT) 4 MG ODT tab; Take 1 tablet (4 mg) by mouth every 8 hours as needed for nausea      16 minutes spent on the date of the encounter doing chart review, history and exam, documentation and further activities per the note           Return in about 1 week (around 8/18/2021) for recheck if symptoms persist.  Patient instructed to return to clinic or contact us sooner if symptoms worsen or new symptoms develop.     Shahid Kendrick MD  [facility]   Sola is a 21 year old who presents for the following health issues     HPI       Watery diarrhea, lightheaded, nauseous, headaches, fevers and chills, symptoms started on Monday morning and have gotten worse  Fevers improving  No abdominal pain  No sick contacts  No travel  No recent antibiotics  No well water or drinking from streams    Review of Systems         Objective           Vitals:  No vitals were obtained today due to virtual visit.    Physical Exam   GENERAL: mildly ill appearing, laying in bed   EYES: Eyes grossly normal to inspection.  No discharge or erythema, or obvious scleral/conjunctival  abnormalities.  RESP: No audible wheeze, cough, or visible cyanosis.  No visible retractions or increased work of breathing.    SKIN: Visible skin clear. No significant rash, abnormal pigmentation or lesions.  NEURO: Cranial nerves grossly intact.  Mentation and speech appropriate for age.  PSYCH: Mentation appears normal, affect normal/bright, judgement and insight intact, normal speech and appearance well-groomed.                Video-Visit Details    Type of service:  Video Visit    Video End Time:1:55 PM    Originating Location (pt. Location): Home    Distant Location (provider location):  [facility]     Platform used for Video Visit: Zeynep

## 2021-09-04 ENCOUNTER — HEALTH MAINTENANCE LETTER (OUTPATIENT)
Age: 21
End: 2021-09-04

## 2022-04-16 ENCOUNTER — HEALTH MAINTENANCE LETTER (OUTPATIENT)
Age: 22
End: 2022-04-16

## 2022-04-24 SDOH — ECONOMIC STABILITY: INCOME INSECURITY: IN THE LAST 12 MONTHS, WAS THERE A TIME WHEN YOU WERE NOT ABLE TO PAY THE MORTGAGE OR RENT ON TIME?: NO

## 2022-04-24 SDOH — ECONOMIC STABILITY: TRANSPORTATION INSECURITY
IN THE PAST 12 MONTHS, HAS LACK OF TRANSPORTATION KEPT YOU FROM MEETINGS, WORK, OR FROM GETTING THINGS NEEDED FOR DAILY LIVING?: NO

## 2022-04-24 SDOH — ECONOMIC STABILITY: FOOD INSECURITY: WITHIN THE PAST 12 MONTHS, YOU WORRIED THAT YOUR FOOD WOULD RUN OUT BEFORE YOU GOT MONEY TO BUY MORE.: NEVER TRUE

## 2022-04-24 SDOH — ECONOMIC STABILITY: INCOME INSECURITY: HOW HARD IS IT FOR YOU TO PAY FOR THE VERY BASICS LIKE FOOD, HOUSING, MEDICAL CARE, AND HEATING?: NOT HARD AT ALL

## 2022-04-24 SDOH — ECONOMIC STABILITY: TRANSPORTATION INSECURITY
IN THE PAST 12 MONTHS, HAS THE LACK OF TRANSPORTATION KEPT YOU FROM MEDICAL APPOINTMENTS OR FROM GETTING MEDICATIONS?: NO

## 2022-04-24 SDOH — HEALTH STABILITY: PHYSICAL HEALTH: ON AVERAGE, HOW MANY MINUTES DO YOU ENGAGE IN EXERCISE AT THIS LEVEL?: 60 MIN

## 2022-04-24 SDOH — ECONOMIC STABILITY: FOOD INSECURITY: WITHIN THE PAST 12 MONTHS, THE FOOD YOU BOUGHT JUST DIDN'T LAST AND YOU DIDN'T HAVE MONEY TO GET MORE.: NEVER TRUE

## 2022-04-24 SDOH — HEALTH STABILITY: PHYSICAL HEALTH: ON AVERAGE, HOW MANY DAYS PER WEEK DO YOU ENGAGE IN MODERATE TO STRENUOUS EXERCISE (LIKE A BRISK WALK)?: 4 DAYS

## 2022-04-24 ASSESSMENT — SOCIAL DETERMINANTS OF HEALTH (SDOH)
HOW OFTEN DO YOU GET TOGETHER WITH FRIENDS OR RELATIVES?: MORE THAN THREE TIMES A WEEK
DO YOU BELONG TO ANY CLUBS OR ORGANIZATIONS SUCH AS CHURCH GROUPS UNIONS, FRATERNAL OR ATHLETIC GROUPS, OR SCHOOL GROUPS?: YES
IN A TYPICAL WEEK, HOW MANY TIMES DO YOU TALK ON THE PHONE WITH FAMILY, FRIENDS, OR NEIGHBORS?: MORE THAN THREE TIMES A WEEK
HOW OFTEN DO YOU ATTEND CHURCH OR RELIGIOUS SERVICES?: MORE THAN 4 TIMES PER YEAR

## 2022-04-24 ASSESSMENT — ENCOUNTER SYMPTOMS
COUGH: 0
BREAST MASS: 0
HEMATURIA: 0
NERVOUS/ANXIOUS: 0
FREQUENCY: 0
PALPITATIONS: 0
SORE THROAT: 0
CHILLS: 0
DIARRHEA: 1
WEAKNESS: 0
DYSURIA: 0
ABDOMINAL PAIN: 0
FEVER: 0
ARTHRALGIAS: 0
JOINT SWELLING: 0
HEADACHES: 1
HEMATOCHEZIA: 0
CONSTIPATION: 0
SHORTNESS OF BREATH: 0
MYALGIAS: 1
DIZZINESS: 0
EYE PAIN: 0
NAUSEA: 1
HEARTBURN: 0
PARESTHESIAS: 0

## 2022-04-24 ASSESSMENT — LIFESTYLE VARIABLES
AUDIT-C TOTAL SCORE: 3
HOW OFTEN DO YOU HAVE A DRINK CONTAINING ALCOHOL: 2-3 TIMES A WEEK
HOW MANY STANDARD DRINKS CONTAINING ALCOHOL DO YOU HAVE ON A TYPICAL DAY: 1 OR 2
HOW OFTEN DO YOU HAVE SIX OR MORE DRINKS ON ONE OCCASION: NEVER
SKIP TO QUESTIONS 9-10: 1

## 2022-04-25 ENCOUNTER — OFFICE VISIT (OUTPATIENT)
Dept: FAMILY MEDICINE | Facility: CLINIC | Age: 22
End: 2022-04-25
Payer: COMMERCIAL

## 2022-04-25 VITALS
WEIGHT: 130.6 LBS | BODY MASS INDEX: 19.79 KG/M2 | TEMPERATURE: 97.8 F | HEART RATE: 62 BPM | RESPIRATION RATE: 18 BRPM | DIASTOLIC BLOOD PRESSURE: 78 MMHG | HEIGHT: 68 IN | SYSTOLIC BLOOD PRESSURE: 112 MMHG

## 2022-04-25 DIAGNOSIS — Z13.9 SCREENING FOR CONDITION: ICD-10-CM

## 2022-04-25 DIAGNOSIS — Z00.00 ROUTINE GENERAL MEDICAL EXAMINATION AT A HEALTH CARE FACILITY: ICD-10-CM

## 2022-04-25 DIAGNOSIS — Z12.4 CERVICAL CANCER SCREENING: ICD-10-CM

## 2022-04-25 DIAGNOSIS — I47.10 PAROXYSMAL SUPRAVENTRICULAR TACHYCARDIA (H): ICD-10-CM

## 2022-04-25 LAB
CHOLEST SERPL-MCNC: 240 MG/DL
FASTING STATUS PATIENT QL REPORTED: ABNORMAL
HDLC SERPL-MCNC: 105 MG/DL
LDLC SERPL CALC-MCNC: 114 MG/DL
NONHDLC SERPL-MCNC: 135 MG/DL
TRIGL SERPL-MCNC: 103 MG/DL

## 2022-04-25 PROCEDURE — 80061 LIPID PANEL: CPT | Performed by: FAMILY MEDICINE

## 2022-04-25 PROCEDURE — 90715 TDAP VACCINE 7 YRS/> IM: CPT | Performed by: FAMILY MEDICINE

## 2022-04-25 PROCEDURE — 99395 PREV VISIT EST AGE 18-39: CPT | Mod: 25 | Performed by: FAMILY MEDICINE

## 2022-04-25 PROCEDURE — G0145 SCR C/V CYTO,THINLAYER,RESCR: HCPCS | Performed by: FAMILY MEDICINE

## 2022-04-25 PROCEDURE — 90471 IMMUNIZATION ADMIN: CPT | Performed by: FAMILY MEDICINE

## 2022-04-25 PROCEDURE — 36415 COLL VENOUS BLD VENIPUNCTURE: CPT | Performed by: FAMILY MEDICINE

## 2022-04-25 RX ORDER — METOPROLOL SUCCINATE 25 MG/1
25 TABLET, EXTENDED RELEASE ORAL
COMMUNITY
Start: 2022-04-21 | End: 2024-04-29

## 2022-04-25 ASSESSMENT — ENCOUNTER SYMPTOMS
ARTHRALGIAS: 0
SORE THROAT: 0
DIZZINESS: 0
DYSURIA: 0
COUGH: 0
SHORTNESS OF BREATH: 0
HEARTBURN: 0
MYALGIAS: 1
ABDOMINAL PAIN: 0
PARESTHESIAS: 0
CONSTIPATION: 0
JOINT SWELLING: 0
EYE PAIN: 0
HEMATOCHEZIA: 0
BREAST MASS: 0
DIARRHEA: 1
CHILLS: 0
PALPITATIONS: 0
WEAKNESS: 0
HEADACHES: 1
NERVOUS/ANXIOUS: 0
FEVER: 0
HEMATURIA: 0
NAUSEA: 1
FREQUENCY: 0
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
## 2022-04-27 LAB
BKR LAB AP GYN ADEQUACY: NORMAL
BKR LAB AP GYN INTERPRETATION: NORMAL
BKR LAB AP HPV REFLEX: NO
BKR LAB AP PREVIOUS ABNORMAL: NORMAL
PATH REPORT.COMMENTS IMP SPEC: NORMAL
PATH REPORT.COMMENTS IMP SPEC: NORMAL
PATH REPORT.RELEVANT HX SPEC: NORMAL

## 2022-06-06 DIAGNOSIS — Z30.41 ENCOUNTER FOR SURVEILLANCE OF CONTRACEPTIVE PILLS: ICD-10-CM

## 2022-06-07 RX ORDER — DROSPIRENONE AND ETHINYL ESTRADIOL 0.03MG-3MG
1 KIT ORAL DAILY
Qty: 84 TABLET | Refills: 2 | Status: SHIPPED | OUTPATIENT
Start: 2022-06-07 | End: 2023-02-23

## 2022-10-22 ENCOUNTER — HEALTH MAINTENANCE LETTER (OUTPATIENT)
Age: 22
End: 2022-10-22

## 2023-02-20 ENCOUNTER — TELEPHONE (OUTPATIENT)
Dept: FAMILY MEDICINE | Facility: CLINIC | Age: 23
End: 2023-02-20
Payer: COMMERCIAL

## 2023-02-20 NOTE — TELEPHONE ENCOUNTER
Patient Quality Outreach    Patient is due for the following:   Physical Annual Wellness Visit      Topic Date Due     Pneumococcal Vaccine (1 - PCV) Never done     COVID-19 Vaccine (4 - Booster for Pfizer series) 12/02/2021     Flu Vaccine (1) 09/01/2022       Next Steps:   Schedule a Adult Preventative    Type of outreach:    Phone, left message for patient/parent to call back.    Next Steps:  Reach out within 90 days via Letter.    Max number of attempts reached: Yes. Will try again in 90 days if patient still on fail list.    Questions for provider review:    None     Eloisa Ugarte MA  Chart routed to Care Team.

## 2023-02-20 NOTE — LETTER
[facility]  [address] [facility] [address]  [phone]  February 20, 2023    Sola Walker  [address]  [place] [facility] [address]    Dear Sola,    I care about your health and have reviewed your health plan. I have reviewed your medical conditions, medication list, and lab results and am making recommendations based on this review, to better manage your health.    You are in particular need of attention regarding:  -Wellness (Physical) Visit     I am recommending that you:  {recommendations:-schedule a WELLNESS (Physical) APPOINTMENT with me.   I will check fasting labs the same day - nothing to eat except water and meds for 8-10 hours prior.    Here is a list of Health Maintenance topics that are due now or due soon:  Health Maintenance Due   Topic Date Due     Pneumococcal Vaccine: Pediatrics (0 to 5 Years) and At-Risk Patients (6 to 64 Years) (1 - PCV) Never done     COVID-19 Vaccine (4 - Booster for Pfizer series) 12/02/2021     CHLAMYDIA SCREENING  01/28/2022     INFLUENZA VACCINE (1) 09/01/2022     PHQ-2 (once per calendar year)  01/01/2023       Please call us at [phone] (or use [employer]) to address the above recommendations.     Thank you for trusting [facility] and we appreciate the opportunity to serve you.  We look forward to supporting your healthcare needs in the future.    Healthy Regards,    Ratna Burden MD

## 2023-02-23 DIAGNOSIS — Z30.41 ENCOUNTER FOR SURVEILLANCE OF CONTRACEPTIVE PILLS: ICD-10-CM

## 2023-02-23 RX ORDER — DROSPIRENONE AND ETHINYL ESTRADIOL 0.03MG-3MG
1 KIT ORAL DAILY
Qty: 84 TABLET | Refills: 0 | Status: SHIPPED | OUTPATIENT
Start: 2023-02-23 | End: 2023-03-30

## 2023-02-23 NOTE — TELEPHONE ENCOUNTER
Prescription approved per [facility] Refill Protocol.    Pt has appt 3/30/23    Genesis MERCADO RN

## 2023-03-30 ENCOUNTER — OFFICE VISIT (OUTPATIENT)
Dept: FAMILY MEDICINE | Facility: CLINIC | Age: 23
End: 2023-03-30
Payer: COMMERCIAL

## 2023-03-30 VITALS
DIASTOLIC BLOOD PRESSURE: 68 MMHG | TEMPERATURE: 98.2 F | WEIGHT: 132 LBS | HEIGHT: 68 IN | RESPIRATION RATE: 16 BRPM | HEART RATE: 76 BPM | SYSTOLIC BLOOD PRESSURE: 100 MMHG | BODY MASS INDEX: 20 KG/M2 | OXYGEN SATURATION: 99 %

## 2023-03-30 DIAGNOSIS — Z13.9 SCREENING FOR CONDITION: ICD-10-CM

## 2023-03-30 DIAGNOSIS — I47.10 PAROXYSMAL SUPRAVENTRICULAR TACHYCARDIA (H): ICD-10-CM

## 2023-03-30 DIAGNOSIS — Z00.00 ROUTINE GENERAL MEDICAL EXAMINATION AT A HEALTH CARE FACILITY: Primary | ICD-10-CM

## 2023-03-30 LAB
CHOLEST SERPL-MCNC: 231 MG/DL
HDLC SERPL-MCNC: 85 MG/DL
LDLC SERPL CALC-MCNC: 135 MG/DL
NONHDLC SERPL-MCNC: 146 MG/DL
TRIGL SERPL-MCNC: 53 MG/DL

## 2023-03-30 PROCEDURE — 80061 LIPID PANEL: CPT | Performed by: FAMILY MEDICINE

## 2023-03-30 PROCEDURE — 36415 COLL VENOUS BLD VENIPUNCTURE: CPT | Performed by: FAMILY MEDICINE

## 2023-03-30 PROCEDURE — 99395 PREV VISIT EST AGE 18-39: CPT | Performed by: FAMILY MEDICINE

## 2023-03-30 RX ORDER — RIZATRIPTAN BENZOATE 5 MG/1
5 TABLET ORAL
Qty: 18 TABLET | Refills: 11 | Status: SHIPPED | OUTPATIENT
Start: 2023-03-30 | End: 2024-04-29

## 2023-03-30 ASSESSMENT — ENCOUNTER SYMPTOMS
HEARTBURN: 0
DIARRHEA: 1
HEMATOCHEZIA: 1
EYE PAIN: 0
PARESTHESIAS: 0
DIZZINESS: 0
SHORTNESS OF BREATH: 0
FEVER: 0
HEMATURIA: 0
HEADACHES: 1
CHILLS: 0
ABDOMINAL PAIN: 0
PALPITATIONS: 0
BREAST MASS: 0
DYSURIA: 0
CONSTIPATION: 0
ARTHRALGIAS: 0
NAUSEA: 0
SORE THROAT: 0
MYALGIAS: 0
WEAKNESS: 0
FREQUENCY: 0
NERVOUS/ANXIOUS: 0
COUGH: 0
JOINT SWELLING: 0

## 2023-03-30 NOTE — PROGRESS NOTES
SUBJECTIVE:   CC: Sola is an 22 year old who presents for preventive health visit.   Additional Questions 3/30/2023   Roomed by Edel Olsen CMA   Patient has been advised of split billing requirements and indicates understanding: Yes  Healthy Habits:     Getting at least 3 servings of Calcium per day:  Yes    Bi-annual eye exam:  Yes    Dental care twice a year:  Yes    Sleep apnea or symptoms of sleep apnea:  None    Diet:  Regular (no restrictions)    Frequency of exercise:  4-5 days/week    Duration of exercise:  Greater than 60 minutes    Taking medications regularly:  Yes    Medication side effects:  None    PHQ-2 Total Score: 0    Additional concerns today:  Yes    Works as the RN at [employer] .  Doing well , Metoprolol helping with PSVT and Migraine .  Has noticed some changes in the stool .  Blood test were normal       Today's PHQ-2 Score:   PHQ-2 ( 1999 Pfizer) 3/30/2023   Q1: Little interest or pleasure in doing things 0   Q2: Feeling down, depressed or hopeless 0   PHQ-2 Score 0   PHQ-2 Total Score (12-17 Years)- Positive if 3 or more points; Administer PHQ-A if positive -   Q1: Little interest or pleasure in doing things Not at all   Q2: Feeling down, depressed or hopeless Not at all   PHQ-2 Score 0           Social History     Tobacco Use     Smoking status: Never     Smokeless tobacco: Never   Substance Use Topics     Alcohol use: Yes     Comment: 2 drinks/week       Alcohol Use 3/30/2023   Prescreen: >3 drinks/day or >7 drinks/week? No     Reviewed orders with patient.  Reviewed health maintenance and updated orders accordingly - Yes      Breast Cancer Screening:        History of abnormal Pap smear: NO - age 21-29 PAP every 3 years recommended  PAP / HPV Latest Ref Rng & Units 4/25/2022   PAP   Negative for Intraepithelial Lesion or Malignancy (NILM)     Reviewed and updated as needed this visit by clinical staff   Tobacco   Meds              Reviewed and updated as needed this visit by  "Provider                 Past Medical History:   Diagnosis Date     Chronic headaches      Migraine      Mononucleosis      Paroxysmal supraventricular tachycardia (H)      Paroxysmal supraventricular tachycardia (H) 4/25/2022      Past Surgical History:   Procedure Laterality Date     wisdom teeth         Review of Systems   Constitutional: Negative for chills and fever.   HENT: Negative for congestion, ear pain, hearing loss and sore throat.    Eyes: Negative for pain and visual disturbance.   Respiratory: Negative for cough and shortness of breath.    Cardiovascular: Negative for chest pain, palpitations and peripheral edema.   Gastrointestinal: Positive for diarrhea and hematochezia. Negative for abdominal pain, constipation, heartburn and nausea.   Breasts:  Negative for tenderness, breast mass and discharge.   Genitourinary: Negative for dysuria, frequency, genital sores, hematuria, pelvic pain, urgency, vaginal bleeding and vaginal discharge.   Musculoskeletal: Negative for arthralgias, joint swelling and myalgias.   Skin: Negative for rash.   Neurological: Positive for headaches. Negative for dizziness, weakness and paresthesias.   Psychiatric/Behavioral: Negative for mood changes. The patient is not nervous/anxious.         OBJECTIVE:   /68   Pulse 76   Temp 98.2  F (36.8  C) (Oral)   Resp 16   Ht 1.727 m (5' 8\")   Wt 59.9 kg (132 lb)   LMP 03/08/2023 (Exact Date)   SpO2 99%   BMI 20.07 kg/m    Physical Exam  GENERAL: healthy, alert and no distress  EYES: Eyes grossly normal to inspection, PERRL and conjunctivae and sclerae normal  HENT: ear canals and TM's normal, nose and mouth without ulcers or lesions  NECK: no adenopathy, no asymmetry, masses, or scars and thyroid normal to palpation  RESP: lungs clear to auscultation - no rales, rhonchi or wheezes  BREAST: normal without masses, tenderness or nipple discharge and no palpable axillary masses or adenopathy  CV: regular rate and rhythm, " normal S1 S2, no S3 or S4, no murmur, click or rub, no peripheral edema and peripheral pulses strong  ABDOMEN: soft, nontender, no hepatosplenomegaly, no masses and bowel sounds normal  MS: no gross musculoskeletal defects noted, no edema  SKIN: no suspicious lesions or rashes  NEURO: Normal strength and tone, mentation intact and speech normal  PSYCH: mentation appears normal, affect normal/bright    Diagnostic Test Results:  Labs reviewed in Epic    ASSESSMENT/PLAN:   (Z00.00) Routine general medical examination at a health care facility  (primary encounter diagnosis)  Comment: Discussed healthy eating   Plan:Discussed maintaining ideal weight   Discussed regular exercise .  Stopped birth control   Planning for pregnancy   Discussed pre  vitamin .    (G43.109) Migraine with aura and without status migrainosus, not intractable  Comment:   Plan: rizatriptan (MAXALT) 5 MG tablet        Recommend to continue medication as needed     (I47.1) Paroxysmal supraventricular tachycardia (H)  Comment: discussed to continue with the medication .    (Z13.9) Screening for condition  Comment:   Plan: Lipid panel reflex to direct LDL Fasting                COUNSELING:  Reviewed preventive health counseling, as reflected in patient instructions       Regular exercise       Healthy diet/nutrition       Vision screening       Hearing screening        She reports that she has never smoked. She has never used smokeless tobacco.          Ratna Burden MD  [facility]

## 2023-04-03 ENCOUNTER — E-VISIT (OUTPATIENT)
Dept: FAMILY MEDICINE | Facility: CLINIC | Age: 23
End: 2023-04-03
Payer: COMMERCIAL

## 2023-04-03 ENCOUNTER — MYC MEDICAL ADVICE (OUTPATIENT)
Dept: FAMILY MEDICINE | Facility: CLINIC | Age: 23
End: 2023-04-03

## 2023-04-03 DIAGNOSIS — M54.50 LOW BACK PAIN WITHOUT SCIATICA, UNSPECIFIED BACK PAIN LATERALITY, UNSPECIFIED CHRONICITY: Primary | ICD-10-CM

## 2023-04-03 PROCEDURE — 99421 OL DIG E/M SVC 5-10 MIN: CPT | Performed by: FAMILY MEDICINE

## 2023-04-03 RX ORDER — CYCLOBENZAPRINE HCL 10 MG
10 TABLET ORAL 3 TIMES DAILY PRN
Qty: 30 TABLET | Refills: 0 | Status: SHIPPED | OUTPATIENT
Start: 2023-04-03

## 2023-04-03 NOTE — PATIENT INSTRUCTIONS
Caring for Your Back    You are not alone.    Low back pain is very common. Nearly half of all adults will have low back pain in any given year. The good news is that back pain is rarely a danger to your health. Most people can manage their back pain on their own. About half of people start feeling better within two weeks. In 9 out of 10 cases, low back pain goes away or no longer limits daily activity within 6 weeks.     Your outlook is good!     Your symptoms tell us that your low back pain is most likely not a danger to you. Most of the time we will not know the exact cause of low back pain, even if you see a doctor or have an MRI. However, treatment can still work without knowing the cause of the pain. Less than 1 in 100 people need surgery for their back pain.     What can I do about my low back pain?     There are three basic things you can do to ease low back pain and help it go away.     Use heat or cold packs.    Take medicine as directed.    Use positions, movements and exercises.    Using heat or cold packs:    Try cold packs or gentle heat to ease your pain.  Use whichever gives you the most relief. Apply the cold pack or heat for 15 minutes at a time, as often as needed.    Taking medicine:    If taking over-the-counter medicine:    Take ibuprofen (Advil, Motrin) 600 mg three times a day as needed for pain.  OR    Take Aleve (naproxen) 220 to 440 mg two times a day as needed for pain. If your doctor prescribed a muscle relaxant (cyclobenzaprine 10 mg.):    Take   to 1 tablet at bedtime.    Do not drive when taking this medicine. This drug may make you sleepy.     Using positions, movements and exercises:    Research tells us that moving your joints and muscles can help you recover from back pain. Such activity should be simple and gentle. Use the positions below as well as walking to help relieve your pain. Try taking a short walk every 3 to 4 hours during the day. Walk for a few minutes inside your  home or take longer walks outside, on a treadmill or at a mall. Slowly increase the amount of time you walk. Expect discomfort when you begin, but it should lessen as your back starts to heal. When your back feels better, walk daily to keep your back and body healthy.    Finding a position that is comfortable:    When your back pain is new, certain positions will ease your pain. Gently try each of the positions below until you find one that is helpful. Once you find a position of comfort, use it as often as you like when you are resting. You will recover faster if you combine rest with activity.    * Lie on your back with your legs bent. You can do this by placing a pillow under your knees or lie on the floor and rest your lower legs on the seat of a chair.  * Lie on your side with your knees bent and place a pillow between your knees.    Lie on your stomach over pillows.       When should I call my doctor?    Your back pain should improve over the first couple of weeks. As it improves, you should be able to return to your normal activities.  But call your doctor if:      You have a sudden change in your ability to control your bladder or bowels.    You begin to feel tingling in your groin or legs.    The pain spreads down your leg and into your foot.    Your toes, feet or leg muscles begin to feel weak.    You feel generally unwell or sick.    Your pain gets worse.    If you are deaf or hard of hearing, please let us know. We provide many free services including sign language interpreters, oral interpreters, TTYs, telephone amplifiers, note takers and written materials.    For informational purposes only. Not to replace the advice of your health care provider. Copyright   2013 [facility]. All rights reserved. [employer] [id number] - 04/13.

## 2023-04-03 NOTE — TELEPHONE ENCOUNTER
Provider E-Visit time total (minutes): 5 min   I have send medication , contact if symptoms fail to improve .

## 2023-12-12 ENCOUNTER — TRANSFERRED RECORDS (OUTPATIENT)
Dept: HEALTH INFORMATION MANAGEMENT | Facility: CLINIC | Age: 23
End: 2023-12-12
Payer: COMMERCIAL

## 2024-02-29 ENCOUNTER — PATIENT OUTREACH (OUTPATIENT)
Dept: CARE COORDINATION | Facility: CLINIC | Age: 24
End: 2024-02-29
Payer: COMMERCIAL

## 2024-03-14 ENCOUNTER — PATIENT OUTREACH (OUTPATIENT)
Dept: CARE COORDINATION | Facility: CLINIC | Age: 24
End: 2024-03-14
Payer: COMMERCIAL

## 2024-04-29 ENCOUNTER — OFFICE VISIT (OUTPATIENT)
Dept: FAMILY MEDICINE | Facility: CLINIC | Age: 24
End: 2024-04-29
Payer: COMMERCIAL

## 2024-04-29 ENCOUNTER — MYC REFILL (OUTPATIENT)
Dept: FAMILY MEDICINE | Facility: CLINIC | Age: 24
End: 2024-04-29

## 2024-04-29 VITALS
WEIGHT: 130.9 LBS | TEMPERATURE: 98.4 F | OXYGEN SATURATION: 99 % | SYSTOLIC BLOOD PRESSURE: 116 MMHG | DIASTOLIC BLOOD PRESSURE: 72 MMHG | HEIGHT: 69 IN | BODY MASS INDEX: 19.39 KG/M2 | RESPIRATION RATE: 18 BRPM | HEART RATE: 71 BPM

## 2024-04-29 DIAGNOSIS — G43.819 OTHER MIGRAINE WITHOUT STATUS MIGRAINOSUS, INTRACTABLE: ICD-10-CM

## 2024-04-29 DIAGNOSIS — Z86.0100 HISTORY OF COLONIC POLYPS: ICD-10-CM

## 2024-04-29 DIAGNOSIS — Z00.00 WELL ADULT EXAM: Primary | ICD-10-CM

## 2024-04-29 DIAGNOSIS — I47.10 PAROXYSMAL SUPRAVENTRICULAR TACHYCARDIA (H): ICD-10-CM

## 2024-04-29 DIAGNOSIS — R11.0 NAUSEA: ICD-10-CM

## 2024-04-29 PROCEDURE — 99213 OFFICE O/P EST LOW 20 MIN: CPT | Performed by: FAMILY MEDICINE

## 2024-04-29 PROCEDURE — 99395 PREV VISIT EST AGE 18-39: CPT | Performed by: FAMILY MEDICINE

## 2024-04-29 RX ORDER — ONDANSETRON 4 MG/1
4 TABLET, ORALLY DISINTEGRATING ORAL EVERY 12 HOURS PRN
Qty: 12 TABLET | Refills: 3 | Status: SHIPPED | OUTPATIENT
Start: 2024-04-29

## 2024-04-29 RX ORDER — METOPROLOL SUCCINATE 25 MG/1
25 TABLET, EXTENDED RELEASE ORAL DAILY
Qty: 90 TABLET | Refills: 4 | Status: SHIPPED | OUTPATIENT
Start: 2024-04-29

## 2024-04-29 RX ORDER — RIZATRIPTAN BENZOATE 5 MG/1
5 TABLET ORAL
Qty: 18 TABLET | Refills: 11 | Status: SHIPPED | OUTPATIENT
Start: 2024-04-29

## 2024-04-29 SDOH — HEALTH STABILITY: PHYSICAL HEALTH: ON AVERAGE, HOW MANY DAYS PER WEEK DO YOU ENGAGE IN MODERATE TO STRENUOUS EXERCISE (LIKE A BRISK WALK)?: 6 DAYS

## 2024-04-29 SDOH — HEALTH STABILITY: PHYSICAL HEALTH: ON AVERAGE, HOW MANY MINUTES DO YOU ENGAGE IN EXERCISE AT THIS LEVEL?: 90 MIN

## 2024-04-29 ASSESSMENT — SOCIAL DETERMINANTS OF HEALTH (SDOH)
HOW OFTEN DO YOU GET TOGETHER WITH FRIENDS OR RELATIVES?: MORE THAN THREE TIMES A WEEK
HOW OFTEN DO YOU GET TOGETHER WITH FRIENDS OR RELATIVES?: THREE TIMES A WEEK
IN A TYPICAL WEEK, HOW MANY TIMES DO YOU TALK ON THE PHONE WITH FAMILY, FRIENDS, OR NEIGHBORS?: MORE THAN THREE TIMES A WEEK
HOW OFTEN DO YOU ATTENT MEETINGS OF THE CLUB OR ORGANIZATION YOU BELONG TO?: MORE THAN 4 TIMES PER YEAR
HOW OFTEN DO YOU ATTEND CHURCH OR RELIGIOUS SERVICES?: 1 TO 4 TIMES PER YEAR
DO YOU BELONG TO ANY CLUBS OR ORGANIZATIONS SUCH AS CHURCH GROUPS UNIONS, FRATERNAL OR ATHLETIC GROUPS, OR SCHOOL GROUPS?: YES

## 2024-04-29 ASSESSMENT — LIFESTYLE VARIABLES
HOW MANY STANDARD DRINKS CONTAINING ALCOHOL DO YOU HAVE ON A TYPICAL DAY: 1 OR 2
SKIP TO QUESTIONS 9-10: 1
AUDIT-C TOTAL SCORE: 3
HOW OFTEN DO YOU HAVE SIX OR MORE DRINKS ON ONE OCCASION: NEVER
HOW OFTEN DO YOU HAVE A DRINK CONTAINING ALCOHOL: 2-3 TIMES A WEEK

## 2024-04-29 ASSESSMENT — PAIN SCALES - GENERAL: PAINLEVEL: NO PAIN (0)

## 2024-04-29 NOTE — PROGRESS NOTES
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  Works as an RN .      (I47.10) Paroxysmal supraventricular tachycardia (H24)  Comment: No concern   Recommend to continue current medication .   Plan: metoprolol succinate ER (TOPROL XL) 25 MG 24 hr        tablet            (G43.819) Other migraine without status migrainosus, intractable  Comment: Well controlled .   Medication refilled .  Plan: rizatriptan (MAXALT) 5 MG tablet            (Z86.010) History of colonic polyps  Comment: Will repeat colonoscopy every 5 years .  Family history colon cancer .   Family history skin cancer .         Counseling  Appropriate preventive services were discussed with this patient, including applicable screening as appropriate for fall prevention, nutrition, physical activity, Tobacco-use cessation, weight loss and cognition.  Checklist reviewing preventive services available has been given to the patient.  Reviewed patient's diet, addressing concerns and/or questions.       Noah Selby is a 24 year old, presenting for the following:  Physical (Here for annual preventative visit)        4/29/2024     8:39 AM   Additional Questions   Roomed by Antoinette Tejeda MA   Accompanied by Self        Health Care Directive  Patient does not have a Health Care Directive or Living Will: Patient states has Advance Directive and will bring in a copy to clinic.    HPI          4/29/2024   General Health   How would you rate your overall physical health? Excellent   Feel stress (tense, anxious, or unable to sleep) Not at all    Not at all         4/29/2024   Nutrition   Three or more servings of calcium each day? Yes   Diet: Regular (no restrictions)   How many servings of fruit and vegetables per day? (!) 2-3   How many sweetened beverages each day?  0-1         4/29/2024   Exercise   Days per week of moderate/strenous exercise 6 days    6 days   Average minutes spent exercising at this level 90 min    90 min         4/29/2024   Social Factors   Frequency of gathering with friends or relatives Three times a week    More than three times a week   Worry food won't last until get money to buy more No    No   Food not last or not have enough money for food? No    No   Do you have housing?  Yes    Yes   Are you worried about losing your housing? No    No   Lack of transportation? No    No   Unable to get utilities (heat,electricity)? No    No         4/29/2024   Dental   Dentist two times every year? Yes            Today's PHQ-2 Score:       4/29/2024     7:51 AM   PHQ-2 ( 1999 Pfizer)   Q1: Little interest or pleasure in doing things 0   Q2: Feeling down, depressed or hopeless 0   PHQ-2 Score 0   Q1: Little interest or pleasure in doing things Not at all   Q2: Feeling down, depressed or hopeless Not at all   PHQ-2 Score 0           4/29/2024   Substance Use   Frequency of drinking alcohol? 2-3 times a week   Alcohol more than 3/day or more than 7/wk No   Do you use any other substances recreationally? No     Social History     Tobacco Use    Smoking status: Never    Smokeless tobacco: Never   Vaping Use    Vaping status: Never Used   Substance Use Topics    Alcohol use: Yes     Comment: 2 drinks/week    Drug use: Never             4/29/2024   Breast Cancer Screening   Family history of breast, colon, or ovarian cancer? Yes         4/29/2024   LAST FHS-7 RESULTS   1st degree relative breast or ovarian cancer No   Any relative bilateral breast cancer No   Any male have breast cancer No   Any ONE woman have BOTH breast AND ovarian cancer No   Any woman with breast cancer before 50yrs No   2 or more relatives with breast AND/OR ovarian cancer No   2 or more relatives with breast AND/OR bowel cancer No        Mammogram Screening - Patient under 40 years of age: Routine  "Mammogram Screening not recommended.         4/29/2024   STI Screening   New sexual partner(s) since last STI/HIV test? No     History of abnormal Pap smear: NO - age 30-65 PAP every 5 years with negative HPV co-testing recommended        4/25/2022    11:07 AM   PAP / HPV   PAP Negative for Intraepithelial Lesion or Malignancy (NILM)            4/29/2024   Contraception/Family Planning   Questions about contraception or family planning (!) YES         Reviewed and updated as needed this visit by Provider                    Past Medical History:   Diagnosis Date    Chronic headaches     Migraine     Mononucleosis     Paroxysmal supraventricular tachycardia (H24)     Paroxysmal supraventricular tachycardia (H24) 4/25/2022     Past Surgical History:   Procedure Laterality Date    BIOPSY  12/23    Random colon biopsies    COLONOSCOPY  12/23    Adenomatous polyp removed from ascending colon    wisdom teeth           Review of Systems  CONSTITUTIONAL: NEGATIVE for fever, chills, change in weight  INTEGUMENTARY/SKIN: NEGATIVE for worrisome rashes, moles or lesions  EYES: NEGATIVE for vision changes or irritation  ENT/MOUTH: NEGATIVE for ear, mouth and throat problems  RESP: NEGATIVE for significant cough or SOB  BREAST: NEGATIVE for masses, tenderness or discharge  CV: NEGATIVE for chest pain, palpitations or peripheral edema  GI: NEGATIVE for nausea, abdominal pain, heartburn, or change in bowel habits  : NEGATIVE for frequency, dysuria, or hematuria  MUSCULOSKELETAL: NEGATIVE for significant arthralgias or myalgia  NEURO: NEGATIVE for weakness, dizziness or paresthesias  ENDOCRINE: NEGATIVE for temperature intolerance, skin/hair changes  HEME: NEGATIVE for bleeding problems  PSYCHIATRIC: NEGATIVE for changes in mood or affect     Objective    Exam  /72 (BP Location: Right arm, Patient Position: Sitting, Cuff Size: Adult Regular)   Pulse 71   Temp 98.4  F (36.9  C) (Oral)   Resp 18   Ht 1.74 m (5' 8.5\")   Wt " "59.4 kg (130 lb 14.4 oz)   LMP 04/25/2024 (Exact Date)   SpO2 99%   BMI 19.61 kg/m     Estimated body mass index is 19.61 kg/m  as calculated from the following:    Height as of this encounter: 1.74 m (5' 8.5\").    Weight as of this encounter: 59.4 kg (130 lb 14.4 oz).    Physical Exam  GENERAL: alert and no distress  EYES: Eyes grossly normal to inspection, PERRL and conjunctivae and sclerae normal  HENT: ear canals and TM's normal, nose and mouth without ulcers or lesions  NECK: no adenopathy, no asymmetry, masses, or scars  RESP: lungs clear to auscultation - no rales, rhonchi or wheezes  CV: regular rate and rhythm, normal S1 S2, no S3 or S4, no murmur, click or rub, no peripheral edema  ABDOMEN: soft, nontender, no hepatosplenomegaly, no masses and bowel sounds normal  MS: no gross musculoskeletal defects noted, no edema  SKIN: no suspicious lesions or rashes  NEURO: Normal strength and tone, mentation intact and speech normal  PSYCH: mentation appears normal, affect normal/bright        Signed Electronically by: Ratna Burden MD    "

## 2024-04-30 ENCOUNTER — PATIENT OUTREACH (OUTPATIENT)
Dept: GASTROENTEROLOGY | Facility: CLINIC | Age: 24
End: 2024-04-30
Payer: COMMERCIAL

## 2024-05-15 ENCOUNTER — MYC MEDICAL ADVICE (OUTPATIENT)
Dept: FAMILY MEDICINE | Facility: CLINIC | Age: 24
End: 2024-05-15
Payer: COMMERCIAL

## 2024-05-15 ENCOUNTER — OFFICE VISIT (OUTPATIENT)
Dept: URGENT CARE | Facility: URGENT CARE | Age: 24
End: 2024-05-15
Payer: COMMERCIAL

## 2024-05-15 VITALS
DIASTOLIC BLOOD PRESSURE: 83 MMHG | HEART RATE: 54 BPM | RESPIRATION RATE: 20 BRPM | BODY MASS INDEX: 20.08 KG/M2 | TEMPERATURE: 98.4 F | OXYGEN SATURATION: 100 % | SYSTOLIC BLOOD PRESSURE: 131 MMHG | WEIGHT: 134 LBS
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
## 2024-05-15 DIAGNOSIS — S00.06XA TICK BITE OF SCALP, INITIAL ENCOUNTER: Primary | ICD-10-CM

## 2024-05-15 PROCEDURE — 99213 OFFICE O/P EST LOW 20 MIN: CPT | Performed by: PHYSICIAN ASSISTANT

## 2024-05-15 RX ORDER — DOXYCYCLINE 100 MG/1
100 CAPSULE ORAL 2 TIMES DAILY
Qty: 28 CAPSULE | Refills: 0 | Status: SHIPPED | OUTPATIENT
Start: 2024-05-15 | End: 2024-05-29

## 2024-05-15 NOTE — TELEPHONE ENCOUNTER
Writer called patient, in regards to my chart message she sent regarding getting a tick bite. Writer also returned my chart message.

## 2024-05-24 ENCOUNTER — MYC MEDICAL ADVICE (OUTPATIENT)
Dept: FAMILY MEDICINE | Facility: CLINIC | Age: 24
End: 2024-05-24
Payer: COMMERCIAL

## 2024-05-24 ENCOUNTER — NURSE TRIAGE (OUTPATIENT)
Dept: FAMILY MEDICINE | Facility: CLINIC | Age: 24
End: 2024-05-24
Payer: COMMERCIAL

## 2024-05-24 DIAGNOSIS — S00.96XS TICK BITE OF HEAD, UNSPECIFIED PART, SEQUELA: Primary | ICD-10-CM
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
## 2024-05-24 NOTE — PROGRESS NOTES
SUBJECTIVE:  Sola Walker is a 24 year old female who presents to the clinic today for tick bite on scalp.    Past Medical History:   Diagnosis Date    Chronic headaches     Migraine     Mononucleosis     Paroxysmal supraventricular tachycardia (H24)     Paroxysmal supraventricular tachycardia (H24) 4/25/2022     Current Outpatient Medications   Medication Sig Dispense Refill    doxycycline hyclate (VIBRAMYCIN) 100 MG capsule Take 1 capsule (100 mg) by mouth 2 times daily for 14 days 28 capsule 0    metoprolol succinate ER (TOPROL XL) 25 MG 24 hr tablet Take 1 tablet (25 mg) by mouth daily 90 tablet 4    cyclobenzaprine (FLEXERIL) 10 MG tablet Take 1 tablet (10 mg) by mouth 3 times daily as needed for muscle spasms 30 tablet 0    ondansetron (ZOFRAN ODT) 4 MG ODT tab Take 1 tablet (4 mg) by mouth every 12 hours as needed for nausea (Patient not taking: Reported on 5/15/2024) 12 tablet 3    rizatriptan (MAXALT) 5 MG tablet Take 1 tablet (5 mg) by mouth at onset of headache for migraine May repeat in 2 hours. Max 6 tablets/24 hours. (Patient not taking: Reported on 5/15/2024) 18 tablet 11     Social History     Tobacco Use    Smoking status: Never    Smokeless tobacco: Never   Substance Use Topics    Alcohol use: Yes     Comment: 2 drinks/week       ROS:  Review of systems negative except as stated above.    EXAM:   /83   Pulse 54   Temp 98.4  F (36.9  C) (Tympanic)   Resp 20   Wt 60.8 kg (134 lb)   LMP 04/25/2024 (Exact Date)   SpO2 100%   BMI 20.08 kg/m    GENERAL: alert, no acute distress.  SKIN: minimal erythema  GENERAL APPEARANCE: healthy, alert and no distress  NECK: supple, non-tender to palpation, no adenopathy noted  RESP: lungs clear to auscultation - no rales, rhonchi or wheezes  CV: regular rates and rhythm, normal S1 S2, no murmur noted  NEURO: Normal strength and tone, sensory exam grossly normal,  normal speech and mentation    ASSESSMENT:  (S00.06XA,  W57.XXXA) Tick bite of scalp,  initial encounter  (primary encounter diagnosis)  Plan: doxycycline hyclate (VIBRAMYCIN) 100 MG capsule

## 2024-05-24 NOTE — TELEPHONE ENCOUNTER
I don't think her symptoms are related to Doxycycline. I will have staff reach out and get her in to be seen if this doesn't resolve .  If worsening to ED.

## 2024-05-24 NOTE — TELEPHONE ENCOUNTER
Routing to  [facility] pool and triage  So  provider to review and determine if symptoms related to antibiotic or lyme's symptom?    Situation   Patient called back about mychart message     Background   I started Doxycycline 9 days ago per urgent care s recommendation for the tick bite. I woke up today and have tingling & pins/needles in both of my hands. It has gotten worse as the day goes on. No other signs like hives, rash, headache, vision change, speech issue, etc. Is this related to the antibiotic use, and if so, should I stop taking it?       5/15/24  Per up to date - tingling is not listed as a common side effect of antibiotic.   Had swollen lymph nodes on neck- had found tick on scalp  Swollen lymph nodes are the same  Tick site is not red or swollen and never was       Assessment   Denies any other s/s of reaction to antibiotic.   Pins and needle feeling on both hands and all fingers since this morning.   Pin and needle feeling is constant and worse when she washing her hands.   No baseline neck or back pain  No carpral tunnel   See triage     Recommendations   Will send to  provider to review to determine if symptoms related to antibiotic or lyme's symptom?  Explained to patient after provider reviews she will get a call back.   Explained if she would not hear back go to     Additional Information   Negative: Difficult to awaken or acting confused (e.g., disoriented, slurred speech)   Negative: New neurologic deficit that is present NOW, sudden onset of ANY of the following: * Weakness of the face, arm, or leg on one side of the body* Numbness of the face, arm, or leg on one side of the body* Loss of speech or garbled speech   Negative: Sounds like a life-threatening emergency to the triager   Negative: SEVERE weakness (i.e., unable to walk or barely able to walk, requires support) and new-onset or worsening   Negative: Confusion, disorientation, or hallucinations is main symptom   Negative: Dizziness  is main symptom   Negative: Followed a head injury within last 3 days   Negative: Headache (with neurologic deficit)   Negative: Unable to urinate (or only a few drops) and bladder feels very full   Negative: Loss of bladder or bowel control (urine or bowel incontinence; wetting self, leaking stool) of new-onset   Negative: Back pain with numbness (loss of sensation) in groin or rectal area   Negative: Not a tick bite   Negative: Patient sounds very sick or weak to the triager   Negative: Fever or severe headache occurs, 2 to 14 days following the bite   Negative: Widespread rash occurs, 2 to 14 days following the bite   Negative: Can't remove live tick (after using Care Advice)   Negative: Fever and spreading red area or streak   Negative: Fever and area is very tender to touch   Negative: Red streak or red line and length > 2 inches (5 cm)   Negative: Red or very tender (to touch) area and started over 24 hours after the bite   Negative: Red ring or bull's-eye rash occurs around a deer tick bite   Negative: Probable deer tick that was attached > 36 hours (or tick appears swollen, not flat)   Negative: Patient wants to be seen   Negative: Neurologic deficit that was brief (now gone), ANY of the following: * Weakness of the face, arm, or leg on one side of the body * Numbness of the face, arm, or leg on one side of the body * Loss of speech or garbled speech   Negative: Patient sounds very sick or weak to the triager   Negative: Neurologic deficit of gradual onset (e.g., days to weeks), ANY of the following: * Weakness of the face, arm, or leg on one side of the body* Numbness of the face, arm, or leg on one side of the body* Loss of speech or garbled speech   Negative: Aurora palsy suspected (i.e., weakness only one side of the face, developing over hours to days, no other symptoms)   Negative: Neck pain (with neurologic deficit)   Negative: Back pain (with neurologic deficit)   Commented on: Tingling (e.g., pins  "and needles) of the face, arm or leg on one side of the body, that is present now (Exceptions: Chronic or recurrent symptom lasting > 4 weeks; or tingling from known cause, such as: bumped elbow, carpal tunnel syndrome, pinched nerve, frostbite.)     Pins and needle feeling on both hands and all fingers.    Answer Assessment - Initial Assessment Questions  1. SYMPTOM: \"What is the main symptom you are concerned about?\" (e.g., weakness, numbness)      Tingling on both hands and fingers - wash hands makes the pins and needle feeling worse. No numbness , swelling or weakness   2. ONSET: \"When did this start?\" (minutes, hours, days; while sleeping)      This morning after waking up.   3. LAST NORMAL: \"When was the last time you (the patient) were normal (no symptoms)?\"        4. PATTERN \"Does this come and go, or has it been constant since it started?\"  \"Is it present now?\"      Constant and worse with washing hands   5. CARDIAC SYMPTOMS: \"Have you had any of the following symptoms: chest pain, difficulty breathing, palpitations?\"      Denies all   6. NEUROLOGIC SYMPTOMS: \"Have you had any of the following symptoms: headache, dizziness, vision loss, double vision, changes in speech, unsteady on your feet?\"      Denies all   7. OTHER SYMPTOMS: \"Do you have any other symptoms?\"      No fevers denied  other symptoms   8. PREGNANCY: \"Is there any chance you are pregnant?\" \"When was your last menstrual period?\"      No, 25 days ago.    Protocols used: Neurologic Deficit-A-OH, Tick Bite-A-OH    "

## 2024-05-28 NOTE — TELEPHONE ENCOUNTER
Call to Sola, no answer, left message to call back. Will need to see how she is doing now and relay information from provider below. Naa Evangelista R.N.

## 2024-05-29 NOTE — TELEPHONE ENCOUNTER
Routing back to provider for review and to clarify if she still wants an in person appointment also?     Patient called back and was advised of providers note below and    Reviewed 5/24 [employer] message and providers recommendations  for lab appointment.     Patient is still having tingling in both hands and fingers.   Tingling in the left hand at times goes up to forearm   Tingling varies in intensity through out the day.   At times she can barley notice the tingling and sometimes she feels like her hands and fingers are on fire.   The fire tingling sensation lasts about 5 minutes and has occurred up to 5 times a day.     The fire feeling is triggered cold temperature-  hand washing or if the air conditioning blows on her hands in the car   Denied weakness, headaches , fevers  Or muscles aches   Patient denied any other symptoms     She can not come in Thursday or Friday for lab apt due to work.   She will go to [facility] outpatient lab on Saturday advised of hours from 9-12pm     Patient states understanding and is agreeable to plan.     Patient prefers [employer] message  over calling.

## 2024-05-29 NOTE — TELEPHONE ENCOUNTER
Outreach #2--message left for [facility] to call back.Naa Evangelista R.N.

## 2024-05-30 NOTE — TELEPHONE ENCOUNTER
Yes please call patient and assist in making the lab appointment .  Will reach out with lab results .  If her symptoms still persistent we can follow up as well to determine if there is any other etiology .    Ratna

## 2024-05-30 NOTE — TELEPHONE ENCOUNTER
Yes please see other message will recommend follow up and lab .   I can add her on Friday at 9 .  Some other differential include carpal tunnel , Tennis elbow as symptoms coming and going .  Also lab appointment to rule out any lyme disease .     Ratna

## 2024-06-03 ENCOUNTER — LAB (OUTPATIENT)
Dept: LAB | Facility: CLINIC | Age: 24
End: 2024-06-03
Payer: COMMERCIAL
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
## 2024-06-03 PROCEDURE — 36415 COLL VENOUS BLD VENIPUNCTURE: CPT

## 2024-06-03 PROCEDURE — 86618 LYME DISEASE ANTIBODY: CPT

## 2024-06-04 LAB — B BURGDOR IGG+IGM SER QL: 0.08

## 2024-11-07 ENCOUNTER — E-VISIT (OUTPATIENT)
Dept: URGENT CARE | Facility: CLINIC | Age: 24
End: 2024-11-07
Payer: COMMERCIAL

## 2024-11-07 DIAGNOSIS — N39.0 RECURRENT UTI: Primary | ICD-10-CM

## 2024-11-07 PROCEDURE — 99207 PR NON-BILLABLE SERV PER CHARTING: CPT | Performed by: NURSE PRACTITIONER

## 2024-11-07 NOTE — PATIENT INSTRUCTIONS
Dear Sola Walker,    We are sorry you are not feeling well. Based on the responses you provided, it is recommended that you be seen in-person in urgent care so we can better evaluate your symptoms. Please click here to find the nearest urgent care location to you.   You will not be charged for this Visit. Thank you for trusting us with your care.    MALLORIE Chavez CNP

## 2024-11-08 ENCOUNTER — OFFICE VISIT (OUTPATIENT)
Dept: URGENT CARE | Facility: URGENT CARE | Age: 24
End: 2024-11-08
Payer: COMMERCIAL

## 2024-11-08 VITALS
DIASTOLIC BLOOD PRESSURE: 66 MMHG | WEIGHT: 134 LBS | BODY MASS INDEX: 20.08 KG/M2 | RESPIRATION RATE: 20 BRPM | TEMPERATURE: 97.2 F | OXYGEN SATURATION: 100 % | SYSTOLIC BLOOD PRESSURE: 108 MMHG | HEART RATE: 59 BPM

## 2024-11-08 DIAGNOSIS — N39.0 RECURRENT UTI: Primary | ICD-10-CM

## 2024-11-08 LAB
ALBUMIN UR-MCNC: NEGATIVE MG/DL
APPEARANCE UR: ABNORMAL
BACTERIA #/AREA URNS HPF: ABNORMAL /HPF
BILIRUB UR QL STRIP: NEGATIVE
CLUE CELLS: NORMAL
COLOR UR AUTO: YELLOW
GLUCOSE UR STRIP-MCNC: NEGATIVE MG/DL
HGB UR QL STRIP: ABNORMAL
KETONES UR STRIP-MCNC: NEGATIVE MG/DL
LEUKOCYTE ESTERASE UR QL STRIP: ABNORMAL
NITRATE UR QL: NEGATIVE
PH UR STRIP: 7 [PH] (ref 5–7)
RBC #/AREA URNS AUTO: ABNORMAL /HPF
SP GR UR STRIP: 1.02 (ref 1–1.03)
TRICHOMONAS, WET PREP: NORMAL
UROBILINOGEN UR STRIP-ACNC: 0.2 E.U./DL
WBC #/AREA URNS AUTO: >100 /HPF
WBC CLUMPS #/AREA URNS HPF: PRESENT /HPF
WBC'S/HIGH POWER FIELD, WET PREP: NORMAL
YEAST, WET PREP: NORMAL

## 2024-11-08 PROCEDURE — 99214 OFFICE O/P EST MOD 30 MIN: CPT | Performed by: PHYSICIAN ASSISTANT

## 2024-11-08 PROCEDURE — 87186 SC STD MICRODIL/AGAR DIL: CPT | Performed by: PHYSICIAN ASSISTANT

## 2024-11-08 PROCEDURE — 81001 URINALYSIS AUTO W/SCOPE: CPT

## 2024-11-08 PROCEDURE — 87210 SMEAR WET MOUNT SALINE/INK: CPT

## 2024-11-08 PROCEDURE — 87086 URINE CULTURE/COLONY COUNT: CPT | Performed by: PHYSICIAN ASSISTANT

## 2024-11-08 RX ORDER — CEPHALEXIN 500 MG/1
500 CAPSULE ORAL 2 TIMES DAILY
Qty: 14 CAPSULE | Refills: 1 | Status: SHIPPED | OUTPATIENT
Start: 2024-11-08 | End: 2024-11-15

## 2024-11-08 RX ORDER — ACETAMINOPHEN AND CODEINE PHOSPHATE 120; 12 MG/5ML; MG/5ML
SOLUTION ORAL
COMMUNITY
Start: 2024-10-01

## 2024-11-08 NOTE — PROGRESS NOTES
Assessment & Plan:      Problem List Items Addressed This Visit    None  Visit Diagnoses       Recurrent UTI    -  Primary    Relevant Medications    cephALEXin (KEFLEX) 500 MG capsule    Other Relevant Orders    UA Macroscopic with reflex to Microscopic and Culture - Clinic Collect (Completed)    Wet prep - lab collect (Completed)    Urine Microscopic Exam (Completed)    Urine Culture          Medical Decision Making  Patient presents with 1 to 2 days of dysuria and increased urinary urgency following recent treatment for 2 previous UTIs in the last month.  Repeat urinalysis today again shows signs concerning for acute cystitis.  Will treat patient with oral antibiotics.  Discussed helpful tips for preventing recurrent UTIs.  Recommend patient follow-up with primary care if symptoms return again or if symptoms not completely resolved.     Subjective:      Sola Walker is a 24 year old female here for evaluation of dysuria and increased urinary urgency.  Onset of symptoms was 2 days ago.  Patient has been treated for 2 urinary tract infections in the last month.  Last day of antibiotics was 2 days ago.  Patient was recently on 10 days of oral Macrobid with good relief of symptoms.  24 hours after discontinuing antibiotics, patient's symptoms returned.  She otherwise denies fevers, nausea, vomiting, and new back pains.  Patient does report that she has a new sexual partner.     The following portions of the patient's history were reviewed and updated as appropriate: allergies, current medications, and problem list.     Review of Systems  Pertinent items are noted in HPI.    Allergies  No Known Allergies    Family History   Problem Relation Age of Onset    Familial Adenomatous Polyposis (FAP) Mother     Arthritis Mother     Hyperlipidemia Mother     Familial Adenomatous Polyposis (FAP) Father     Hypertension Father     Kidney Disease Father     GERD Father     Hyperlipidemia Father     Anxiety Disorder Father      Cancer Maternal Grandmother     Alcoholism Maternal Grandmother     Substance Abuse Maternal Grandmother     Thyroid Disease Maternal Grandfather     Alcoholism Maternal Grandfather     Substance Abuse Maternal Grandfather     Arthritis Paternal Grandmother     Cancer Paternal Grandmother     Anxiety Disorder Sister     Anxiety Disorder Sister        Social History     Tobacco Use    Smoking status: Never    Smokeless tobacco: Never   Substance Use Topics    Alcohol use: Yes     Comment: 2 drinks/week        Objective:      /66   Pulse 59   Temp 97.2  F (36.2  C)   Resp 20   Wt 60.8 kg (134 lb)   LMP 09/07/2024   SpO2 100%   BMI 20.08 kg/m    General appearance - alert, well appearing, and in no distress and non-toxic     Lab & Imaging Results    Results for orders placed or performed in visit on 11/08/24   UA Macroscopic with reflex to Microscopic and Culture - Clinic Collect     Status: Abnormal    Specimen: Urine, Clean Catch   Result Value Ref Range    Color Urine Yellow Colorless, Straw, Light Yellow, Yellow    Appearance Urine Cloudy (A) Clear    Glucose Urine Negative Negative mg/dL    Bilirubin Urine Negative Negative    Ketones Urine Negative Negative mg/dL    Specific Gravity Urine 1.020 1.003 - 1.035    Blood Urine Small (A) Negative    pH Urine 7.0 5.0 - 7.0    Protein Albumin Urine Negative Negative mg/dL    Urobilinogen Urine 0.2 0.2, 1.0 E.U./dL    Nitrite Urine Negative Negative    Leukocyte Esterase Urine Large (A) Negative   Urine Microscopic Exam     Status: Abnormal   Result Value Ref Range    Bacteria Urine Moderate (A) None Seen /HPF    RBC Urine 0-2 0-2 /HPF /HPF    WBC Urine >100 (A) 0-5 /HPF /HPF    WBC Clumps Urine Present (A) None Seen /HPF   Wet prep - lab collect     Status: Normal    Specimen: Vagina; Swab   Result Value Ref Range    Trichomonas Absent Absent    Yeast Absent Absent    Clue Cells Absent Absent    WBCs/high power field None None       I personally reviewed  these results and discussed findings with the patient.    The use of Dragon/[employer] dictation services was used to construct the content of this note; any grammatical errors are non-intentional. Please contact the author directly if you are in need of any clarification.

## 2024-11-10 LAB — BACTERIA UR CULT: ABNORMAL

## 2024-11-12 ENCOUNTER — MYC MEDICAL ADVICE (OUTPATIENT)
Dept: FAMILY MEDICINE | Facility: CLINIC | Age: 24
End: 2024-11-12
Payer: COMMERCIAL

## 2024-11-12 NOTE — TELEPHONE ENCOUNTER
Please see Emanit and advise.    Pt will need appt however unsure if your able to fit her in today or if she may need to see another provider?    Genesis MERCADO RN, BSN PHN

## 2024-11-18 ENCOUNTER — OFFICE VISIT (OUTPATIENT)
Dept: PEDIATRICS | Facility: CLINIC | Age: 24
End: 2024-11-18
Payer: COMMERCIAL

## 2024-11-18 VITALS
BODY MASS INDEX: 19.26 KG/M2 | HEART RATE: 79 BPM | TEMPERATURE: 98.2 F | HEIGHT: 69 IN | DIASTOLIC BLOOD PRESSURE: 60 MMHG | SYSTOLIC BLOOD PRESSURE: 104 MMHG | OXYGEN SATURATION: 98 % | RESPIRATION RATE: 16 BRPM | WEIGHT: 130 LBS

## 2024-11-18 DIAGNOSIS — N39.0 RECURRENT UTI: Primary | ICD-10-CM

## 2024-11-18 DIAGNOSIS — Z11.3 SCREENING EXAMINATION FOR STI: ICD-10-CM

## 2024-11-18 LAB
ALBUMIN UR-MCNC: NEGATIVE MG/DL
APPEARANCE UR: CLEAR
BACTERIA #/AREA URNS HPF: ABNORMAL /HPF
BILIRUB UR QL STRIP: NEGATIVE
COLOR UR AUTO: YELLOW
GLUCOSE UR STRIP-MCNC: NEGATIVE MG/DL
HGB UR QL STRIP: NEGATIVE
KETONES UR STRIP-MCNC: NEGATIVE MG/DL
LEUKOCYTE ESTERASE UR QL STRIP: ABNORMAL
NITRATE UR QL: POSITIVE
PH UR STRIP: 6 [PH] (ref 5–7)
RBC #/AREA URNS AUTO: ABNORMAL /HPF
SP GR UR STRIP: 1.01 (ref 1–1.03)
SQUAMOUS #/AREA URNS AUTO: ABNORMAL /LPF
UROBILINOGEN UR STRIP-ACNC: 0.2 E.U./DL
WBC #/AREA URNS AUTO: ABNORMAL /HPF

## 2024-11-18 PROCEDURE — 87186 SC STD MICRODIL/AGAR DIL: CPT | Performed by: NURSE PRACTITIONER

## 2024-11-18 PROCEDURE — 99213 OFFICE O/P EST LOW 20 MIN: CPT | Performed by: NURSE PRACTITIONER

## 2024-11-18 PROCEDURE — 81001 URINALYSIS AUTO W/SCOPE: CPT | Performed by: NURSE PRACTITIONER

## 2024-11-18 PROCEDURE — 87591 N.GONORRHOEAE DNA AMP PROB: CPT | Performed by: NURSE PRACTITIONER

## 2024-11-18 PROCEDURE — 87491 CHLMYD TRACH DNA AMP PROBE: CPT | Performed by: NURSE PRACTITIONER

## 2024-11-18 RX ORDER — CIPROFLOXACIN 500 MG/1
500 TABLET, FILM COATED ORAL 2 TIMES DAILY
Qty: 10 TABLET | Refills: 0 | Status: SHIPPED | OUTPATIENT
Start: 2024-11-18

## 2024-11-18 ASSESSMENT — PAIN SCALES - GENERAL: PAINLEVEL_OUTOF10: NO PAIN (0)

## 2024-11-18 NOTE — PATIENT INSTRUCTIONS
It was nice seeing you today.    Please let me know if you have any questions regarding today's visit!    Take care,    CHELA Nowak DNP  Family Medicine

## 2024-11-18 NOTE — PROGRESS NOTES
"  Assessment & Plan     Recurrent UTI  Urinalysis today.  Started on ciprofloxacin  Can get pelvic US if needed and/or refer to OBGYN if negative urinalysis.  - UA with Microscopic reflex to Culture - lab collect; Future  - UA with Microscopic reflex to Culture - lab collect  - ciprofloxacin (CIPRO) 500 MG tablet; Take 1 tablet (500 mg) by mouth 2 times daily.  - Urine Microscopic Exam  - Urine Culture    Screening examination for STI  - NEISSERIA GONORRHOEA PCR  - CHLAMYDIA TRACHOMATIS PCR      Noah Selby is a 24 year old, presenting for the following health issues:    UTI        11/18/2024     2:43 PM   Additional Questions   Roomed by Eve AMIN CMA   Accompanied by Self         11/18/2024     2:43 PM   Patient Reported Additional Medications   Patient reports taking the following new medications n/a     UTI    History of Present Illness       Reason for visit:  Recurrent UTI.    She eats 2-3 servings of fruits and vegetables daily.She consumes 0 sweetened beverage(s) daily.She exercises with enough effort to increase her heart rate 60 or more minutes per day.  She exercises with enough effort to increase her heart rate 5 days per week.   She is taking medications regularly.     Pt is here for recurrent UTI.   1st UTI in October  Was seen on 11/8/24 in UC and treated with 7 day course.   Symptoms returned as soon as antibiotics were finished.   Burning, urgency, cloudy urine. Currently taking AZO.   No hematuria  Started with new partner.  Requesting STI testing    Review of Systems  Constitutional, HEENT, cardiovascular, pulmonary, gi and gu systems are negative, except as otherwise noted.      Objective    /60   Pulse 79   Temp 98.2  F (36.8  C) (Temporal)   Resp 16   Ht 1.74 m (5' 8.5\")   Wt 59 kg (130 lb)   LMP 09/07/2024   SpO2 98%   BMI 19.48 kg/m    Body mass index is 19.48 kg/m .    Physical Exam   GENERAL: alert and no distress  RESP: lungs clear to auscultation - no rales, " rhonchi or wheezes  CV: regular rate and rhythm, normal S1 S2, no S3 or S4, no murmur, click or rub, no peripheral edema  ABDOMEN: soft, nontender, no hepatosplenomegaly, no masses and bowel sounds normal  PSYCH: mentation appears normal, affect normal/bright        Signed Electronically by: Sylvia Nowak NP

## 2024-11-19 LAB
C TRACH DNA SPEC QL NAA+PROBE: NEGATIVE
N GONORRHOEA DNA SPEC QL NAA+PROBE: NEGATIVE

## 2024-11-21 ENCOUNTER — HOSPITAL ENCOUNTER (EMERGENCY)
Facility: CLINIC | Age: 24
Discharge: HOME | End: 2024-11-21
Attending: EMERGENCY MEDICINE
Payer: COMMERCIAL

## 2024-11-21 ENCOUNTER — APPOINTMENT (OUTPATIENT)
Dept: MRI IMAGING | Facility: CLINIC | Age: 24
End: 2024-11-21
Attending: EMERGENCY MEDICINE
Payer: COMMERCIAL

## 2024-11-21 VITALS
DIASTOLIC BLOOD PRESSURE: 66 MMHG | WEIGHT: 135 LBS | HEIGHT: 68 IN | BODY MASS INDEX: 20.46 KG/M2 | RESPIRATION RATE: 16 BRPM | OXYGEN SATURATION: 98 % | TEMPERATURE: 98.4 F | SYSTOLIC BLOOD PRESSURE: 115 MMHG | HEART RATE: 102 BPM

## 2024-11-21 DIAGNOSIS — H53.9 VISUAL DISTURBANCE: ICD-10-CM

## 2024-11-21 DIAGNOSIS — R51.9 NONINTRACTABLE HEADACHE, UNSPECIFIED CHRONICITY PATTERN, UNSPECIFIED HEADACHE TYPE: ICD-10-CM

## 2024-11-21 DIAGNOSIS — M54.2 NECK PAIN: ICD-10-CM

## 2024-11-21 LAB
ALBUMIN SERPL BCG-MCNC: 4.6 G/DL (ref 3.5–5.2)
ALBUMIN UR-MCNC: NEGATIVE MG/DL
ALP SERPL-CCNC: 48 U/L (ref 40–150)
ALT SERPL W P-5'-P-CCNC: 11 U/L (ref 0–50)
ANION GAP SERPL CALCULATED.3IONS-SCNC: 9 MMOL/L (ref 7–15)
APPEARANCE CSF: CLEAR
APPEARANCE UR: CLEAR
AST SERPL W P-5'-P-CCNC: 16 U/L (ref 0–45)
BACTERIA #/AREA URNS HPF: ABNORMAL /HPF
BACTERIA UR CULT: ABNORMAL
BACTERIA UR CULT: ABNORMAL
BILIRUB SERPL-MCNC: 1.4 MG/DL
BILIRUB UR QL STRIP: NEGATIVE
BUN SERPL-MCNC: 9.2 MG/DL (ref 6–20)
C GATTII+NEOFOR DNA CSF QL NAA+NON-PROBE: NEGATIVE
CALCIUM SERPL-MCNC: 9.7 MG/DL (ref 8.8–10.4)
CHLORIDE SERPL-SCNC: 100 MMOL/L (ref 98–107)
CMV DNA CSF QL NAA+NON-PROBE: NEGATIVE
COLOR CSF: COLORLESS
COLOR UR AUTO: ABNORMAL
CREAT SERPL-MCNC: 0.8 MG/DL (ref 0.51–0.95)
E COLI K1 AG CSF QL: NEGATIVE
EGFRCR SERPLBLD CKD-EPI 2021: >90 ML/MIN/1.73M2
ERYTHROCYTE [DISTWIDTH] IN BLOOD BY AUTOMATED COUNT: 11.9 % (ref 10–15)
EV RNA SPEC QL NAA+PROBE: NEGATIVE
GLUCOSE CSF-MCNC: 65 MG/DL (ref 40–70)
GLUCOSE SERPL-MCNC: 135 MG/DL (ref 70–99)
GLUCOSE UR STRIP-MCNC: 150 MG/DL
GP B STREP DNA CSF QL NAA+NON-PROBE: NEGATIVE
GRAM STAIN RESULT: NORMAL
GRAM STAIN RESULT: NORMAL
HAEM INFLU DNA CSF QL NAA+NON-PROBE: NEGATIVE
HCG SERPL QL: NEGATIVE
HCO3 SERPL-SCNC: 27 MMOL/L (ref 22–29)
HCT VFR BLD AUTO: 37.5 % (ref 35–47)
HGB BLD-MCNC: 13.3 G/DL (ref 11.7–15.7)
HGB UR QL STRIP: NEGATIVE
HHV6 DNA CSF QL NAA+NON-PROBE: NEGATIVE
HSV1 DNA CSF QL NAA+NON-PROBE: NEGATIVE
HSV2 DNA CSF QL NAA+NON-PROBE: NEGATIVE
INR PPP: 1.02 (ref 0.85–1.15)
KETONES UR STRIP-MCNC: NEGATIVE MG/DL
L MONOCYTOG DNA CSF QL NAA+NON-PROBE: NEGATIVE
LACTATE SERPL-SCNC: 1.4 MMOL/L (ref 0.7–2)
LEUKOCYTE ESTERASE UR QL STRIP: NEGATIVE
MCH RBC QN AUTO: 31.7 PG (ref 26.5–33)
MCHC RBC AUTO-ENTMCNC: 35.5 G/DL (ref 31.5–36.5)
MCV RBC AUTO: 90 FL (ref 78–100)
N MEN DNA CSF QL NAA+NON-PROBE: NEGATIVE
NITRATE UR QL: NEGATIVE
PARECHOVIRUS A RNA CSF QL NAA+NON-PROBE: NEGATIVE
PH UR STRIP: 7.5 [PH] (ref 5–7)
PLATELET # BLD AUTO: 199 10E3/UL (ref 150–450)
POTASSIUM SERPL-SCNC: 4.2 MMOL/L (ref 3.4–5.3)
PROT CSF-MCNC: 21.8 MG/DL (ref 15–45)
PROT SERPL-MCNC: 7.3 G/DL (ref 6.4–8.3)
RBC # BLD AUTO: 4.19 10E6/UL (ref 3.8–5.2)
RBC # CSF MANUAL: 1 /UL (ref 0–2)
RBC URINE: <1 /HPF
S PNEUM DNA CSF QL NAA+NON-PROBE: NEGATIVE
SODIUM SERPL-SCNC: 136 MMOL/L (ref 135–145)
SP GR UR STRIP: 1.01 (ref 1–1.03)
SQUAMOUS EPITHELIAL: <1 /HPF
TUBE # CSF: 4
UROBILINOGEN UR STRIP-MCNC: NORMAL MG/DL
VZV DNA CSF QL NAA+NON-PROBE: NEGATIVE
WBC # BLD AUTO: 10.4 10E3/UL (ref 4–11)
WBC # CSF MANUAL: 2 /UL (ref 0–5)
WBC URINE: <1 /HPF

## 2024-11-21 PROCEDURE — 80053 COMPREHEN METABOLIC PANEL: CPT | Performed by: EMERGENCY MEDICINE

## 2024-11-21 PROCEDURE — 85610 PROTHROMBIN TIME: CPT | Performed by: EMERGENCY MEDICINE

## 2024-11-21 PROCEDURE — 87483 CNS DNA AMP PROBE TYPE 12-25: CPT | Performed by: EMERGENCY MEDICINE

## 2024-11-21 PROCEDURE — 82435 ASSAY OF BLOOD CHLORIDE: CPT | Performed by: EMERGENCY MEDICINE

## 2024-11-21 PROCEDURE — 84155 ASSAY OF PROTEIN SERUM: CPT | Performed by: EMERGENCY MEDICINE

## 2024-11-21 PROCEDURE — 96366 THER/PROPH/DIAG IV INF ADDON: CPT

## 2024-11-21 PROCEDURE — 84157 ASSAY OF PROTEIN OTHER: CPT | Performed by: EMERGENCY MEDICINE

## 2024-11-21 PROCEDURE — 87070 CULTURE OTHR SPECIMN AEROBIC: CPT | Performed by: EMERGENCY MEDICINE

## 2024-11-21 PROCEDURE — 36415 COLL VENOUS BLD VENIPUNCTURE: CPT | Performed by: EMERGENCY MEDICINE

## 2024-11-21 PROCEDURE — 255N000002 HC RX 255 OP 636: Performed by: EMERGENCY MEDICINE

## 2024-11-21 PROCEDURE — 62270 DX LMBR SPI PNXR: CPT

## 2024-11-21 PROCEDURE — 87205 SMEAR GRAM STAIN: CPT | Performed by: EMERGENCY MEDICINE

## 2024-11-21 PROCEDURE — 82945 GLUCOSE OTHER FLUID: CPT | Performed by: EMERGENCY MEDICINE

## 2024-11-21 PROCEDURE — 87015 SPECIMEN INFECT AGNT CONCNTJ: CPT | Performed by: EMERGENCY MEDICINE

## 2024-11-21 PROCEDURE — 84703 CHORIONIC GONADOTROPIN ASSAY: CPT | Performed by: EMERGENCY MEDICINE

## 2024-11-21 PROCEDURE — 258N000003 HC RX IP 258 OP 636: Performed by: EMERGENCY MEDICINE

## 2024-11-21 PROCEDURE — 85027 COMPLETE CBC AUTOMATED: CPT | Performed by: EMERGENCY MEDICINE

## 2024-11-21 PROCEDURE — 70553 MRI BRAIN STEM W/O & W/DYE: CPT

## 2024-11-21 PROCEDURE — 85014 HEMATOCRIT: CPT | Performed by: EMERGENCY MEDICINE

## 2024-11-21 PROCEDURE — 87040 BLOOD CULTURE FOR BACTERIA: CPT | Performed by: EMERGENCY MEDICINE

## 2024-11-21 PROCEDURE — 96375 TX/PRO/DX INJ NEW DRUG ADDON: CPT

## 2024-11-21 PROCEDURE — 99285 EMERGENCY DEPT VISIT HI MDM: CPT | Mod: 25

## 2024-11-21 PROCEDURE — 250N000011 HC RX IP 250 OP 636: Performed by: EMERGENCY MEDICINE

## 2024-11-21 PROCEDURE — 82247 BILIRUBIN TOTAL: CPT | Performed by: EMERGENCY MEDICINE

## 2024-11-21 PROCEDURE — 83605 ASSAY OF LACTIC ACID: CPT | Performed by: EMERGENCY MEDICINE

## 2024-11-21 PROCEDURE — A9585 GADOBUTROL INJECTION: HCPCS | Performed by: EMERGENCY MEDICINE

## 2024-11-21 PROCEDURE — 96365 THER/PROPH/DIAG IV INF INIT: CPT | Mod: 59

## 2024-11-21 PROCEDURE — 81001 URINALYSIS AUTO W/SCOPE: CPT | Performed by: EMERGENCY MEDICINE

## 2024-11-21 PROCEDURE — 89050 BODY FLUID CELL COUNT: CPT | Performed by: EMERGENCY MEDICINE

## 2024-11-21 RX ORDER — CEFTRIAXONE 2 G/1
2 INJECTION, POWDER, FOR SOLUTION INTRAMUSCULAR; INTRAVENOUS ONCE
Status: COMPLETED | OUTPATIENT
Start: 2024-11-21 | End: 2024-11-21

## 2024-11-21 RX ORDER — DEXAMETHASONE SODIUM PHOSPHATE 10 MG/ML
10 INJECTION, SOLUTION INTRAMUSCULAR; INTRAVENOUS ONCE
Status: COMPLETED | OUTPATIENT
Start: 2024-11-21 | End: 2024-11-21

## 2024-11-21 RX ORDER — GADOBUTROL 604.72 MG/ML
6 INJECTION INTRAVENOUS ONCE
Status: COMPLETED | OUTPATIENT
Start: 2024-11-21 | End: 2024-11-21

## 2024-11-21 RX ORDER — DIPHENHYDRAMINE HYDROCHLORIDE 50 MG/ML
25 INJECTION INTRAMUSCULAR; INTRAVENOUS ONCE
Status: COMPLETED | OUTPATIENT
Start: 2024-11-21 | End: 2024-11-21

## 2024-11-21 RX ORDER — KETOROLAC TROMETHAMINE 15 MG/ML
15 INJECTION, SOLUTION INTRAMUSCULAR; INTRAVENOUS ONCE
Status: COMPLETED | OUTPATIENT
Start: 2024-11-21 | End: 2024-11-21

## 2024-11-21 RX ORDER — METOCLOPRAMIDE HYDROCHLORIDE 5 MG/ML
10 INJECTION INTRAMUSCULAR; INTRAVENOUS ONCE
Status: COMPLETED | OUTPATIENT
Start: 2024-11-21 | End: 2024-11-21

## 2024-11-21 RX ADMIN — DEXAMETHASONE SODIUM PHOSPHATE 10 MG: 10 INJECTION, SOLUTION INTRAMUSCULAR; INTRAVENOUS at 15:02

## 2024-11-21 RX ADMIN — DIPHENHYDRAMINE HYDROCHLORIDE 25 MG: 50 INJECTION, SOLUTION INTRAMUSCULAR; INTRAVENOUS at 15:01

## 2024-11-21 RX ADMIN — GADOBUTROL 6 ML: 604.72 INJECTION INTRAVENOUS at 18:29

## 2024-11-21 RX ADMIN — KETOROLAC TROMETHAMINE 15 MG: 15 INJECTION, SOLUTION INTRAMUSCULAR; INTRAVENOUS at 15:04

## 2024-11-21 RX ADMIN — SODIUM CHLORIDE 1000 ML: 9 INJECTION, SOLUTION INTRAVENOUS at 15:01

## 2024-11-21 RX ADMIN — METOCLOPRAMIDE 10 MG: 5 INJECTION, SOLUTION INTRAMUSCULAR; INTRAVENOUS at 15:03

## 2024-11-21 RX ADMIN — CEFTRIAXONE SODIUM 2 G: 2 INJECTION, POWDER, FOR SOLUTION INTRAMUSCULAR; INTRAVENOUS at 15:01

## 2024-11-21 ASSESSMENT — COLUMBIA-SUICIDE SEVERITY RATING SCALE - C-SSRS
2. HAVE YOU ACTUALLY HAD ANY THOUGHTS OF KILLING YOURSELF IN THE PAST MONTH?: NO
1. IN THE PAST MONTH, HAVE YOU WISHED YOU WERE DEAD OR WISHED YOU COULD GO TO SLEEP AND NOT WAKE UP?: NO
6. HAVE YOU EVER DONE ANYTHING, STARTED TO DO ANYTHING, OR PREPARED TO DO ANYTHING TO END YOUR LIFE?: NO

## 2024-11-21 ASSESSMENT — ACTIVITIES OF DAILY LIVING (ADL)
ADLS_ACUITY_SCORE: 0

## 2024-11-21 NOTE — ED PROVIDER NOTES
"  Emergency Department Note      History of Present Illness     Chief Complaint   Neck Pain and Eye Problem      HPI   Sola Walker is a 24 year old female who presents with headaches and neck pain and vomiting and visual disturbances.     Pt was at the urgency room 11/19 with vomiting, headaches, blurred vision (episode of static like vision throughout entire vision for about an hour with bouts of intermittent double vision that was horizontal) and trouble forming cohesive sentences. Pt takes rizatriptan as needed for migraines which didn't help. Also was having neck pain. Sx started the night prior.  Pt reports having UTI since 10/1 that has not improved. She has taken \"6 abx\". Her UTI sx resolved 3 days ago. Pt currently  taking cipro.    Yesterday the patient was still having head throbbing and neck pain. Pts migraines typically are unilateral not BL as recently with her HAs. Also she has never had the vision issues or neck pain or vomiting. Today at work pt had vision issues with static sensation again in both visual fields and vomiting. Pt having neck stiffness. No fevers. No sore throat. No cough. NO abdominal pain. NO numbness or weakness in extremities.     Pt takes metoprolol every day for h/o SVT.     Review of External Notes   Pt was at the urgency room 11/19 with vomiting, headaches, blurred vision:    Impression    HEAD CT:  1.  No acute intracranial process.    2.  No intracranial mass, mass effect or hemorrhage.    HEAD CTA:  1.  No intracranial hemodynamic stenosis, aneurysm, dissection or large vessel occlusion. Normal variation anatomy drainage pattern via dominant left transverse and sigmoid sinus. No pathologic enhancement intracranially. Satisfactory branch arborization pattern and caliber of bilateral VALERIA, MCA and PCA vascular distribution.    NECK CTA:  1.  No hemodynamic stenosis or dissection of the major neck arteries or the great vessel origin level. No pathologic enhancement in the " neck is evident.      Urine cx from 11/18/24:  Culture 10,000-50,000 CFU/mL Escherichia coli Abnormal    10,000-50,000 CFU/mL Escherichia coli Abnormal         Resulting Agency: [facility]     Susceptibility     Escherichia coli (1) Escherichia coli (2)     CAROLA CAROLA     Amikacin Susceptible Susceptible     Ampicillin Resistant Resistant     Ampicillin/ Sulbactam Resistant Resistant     Cefazolin Susceptible 1 Resistant 1     Cefepime Susceptible Susceptible     Cefoxitin Susceptible Susceptible     Ceftazidime Susceptible Susceptible     Ceftriaxone Susceptible Susceptible     Ciprofloxacin Susceptible Susceptible     Gentamicin Resistant Resistant     Levofloxacin Susceptible Susceptible     Nitrofurantoin Susceptible Susceptible     Piperacillin/Tazobactam Susceptible Resistant     Tobramycin Resistant Resistant     Trimethoprim/Sulfamethoxazole Resistant Resistant                1 Cefazolin CAORLA breakpoints are for the treatment of uncomplicated urinary tract infections. For the treatment of systemic infections, please contact the laboratory for additional testing.        Past Medical History     Medical History and Problem List   Past Medical History:   Diagnosis Date    Chronic headaches     Migraine     Mononucleosis     Paroxysmal supraventricular tachycardia (H)     Paroxysmal supraventricular tachycardia (H) 4/25/2022       Medications   ciprofloxacin (CIPRO) 500 MG tablet  metoprolol succinate ER (TOPROL XL) 25 MG 24 hr tablet  norethindrone (MICRONOR) 0.35 MG tablet  ondansetron (ZOFRAN ODT) 4 MG ODT tab  rizatriptan (MAXALT) 5 MG tablet        Surgical History   Past Surgical History:   Procedure Laterality Date    BIOPSY  12/23    Random colon biopsies    COLONOSCOPY  12/23    Adenomatous polyp removed from ascending colon    wisdom teeth         Physical Exam     Patient Vitals for the past 24 hrs:   BP Temp Temp src Pulse Resp SpO2 Height Weight   11/21/24 1854 115/66 -- -- -- -- 98 % -- --   11/21/24 1323  "138/88 98.4  F (36.9  C) Oral 102 16 98 % 1.727 m (5' 8\") 61.2 kg (135 lb)     Physical Exam  VS: Reviewed per above  HENT: Mucous membranes moist, pain with neck flexion  EYES: sclera anicteric  CV: Rate as noted, regular rhythm.   RESP: Effort normal. Breath sounds are normal bilaterally.  GI: no focal tenderness/rebound/guarding, not distended.  NEURO: GCS 15, cranial nerves II through XII are intact, 5 out of 5 strength in all 4 extremities, sensation is intact light touch in all 4 extremities  MSK: No deformity of the extremities  SKIN: Warm and dry      Diagnostics     Lab Results   Labs Ordered and Resulted from Time of ED Arrival to Time of ED Departure   COMPREHENSIVE METABOLIC PANEL - Abnormal       Result Value    Sodium 136      Potassium 4.2      Carbon Dioxide (CO2) 27      Anion Gap 9      Urea Nitrogen 9.2      Creatinine 0.80      GFR Estimate >90      Calcium 9.7      Chloride 100      Glucose 135 (*)     Alkaline Phosphatase 48      AST 16      ALT 11      Protein Total 7.3      Albumin 4.6      Bilirubin Total 1.4 (*)    ROUTINE UA WITH MICROSCOPIC REFLEX TO CULTURE - Abnormal    Color Urine Straw      Appearance Urine Clear      Glucose Urine 150 (*)     Bilirubin Urine Negative      Ketones Urine Negative      Specific Gravity Urine 1.006      Blood Urine Negative      pH Urine 7.5 (*)     Protein Albumin Urine Negative      Urobilinogen Urine Normal      Nitrite Urine Negative      Leukocyte Esterase Urine Negative      Bacteria Urine Few (*)     RBC Urine <1      WBC Urine <1      Squamous Epithelials Urine <1     CBC WITH PLATELETS - Normal    WBC Count 10.4      RBC Count 4.19      Hemoglobin 13.3      Hematocrit 37.5      MCV 90      MCH 31.7      MCHC 35.5      RDW 11.9      Platelet Count 199     HCG QUALITATIVE PREGNANCY - Normal    hCG Serum Qualitative Negative     INR - Normal    INR 1.02     LACTIC ACID WHOLE BLOOD WITH 1X REPEAT IN 2 HR WHEN >2 - Normal    Lactic Acid, Initial 1.4  "    GLUCOSE CSF - Normal    Glucose CSF 65     PROTEIN TOTAL CSF - Normal    Protein total CSF 21.8     CELL COUNT CSF    Tube Number 4      Color Colorless      Clarity Clear      Total Nucleated Cells 2      RBC Count 1     AEROBIC BACTERIAL CULTURE ROUTINE    Gram Stain Result No organisms seen     BLOOD CULTURE   MENINGITIS/ENCEPHALITIS PANEL QUAL PCR CSF   CELL COUNT WITH DIFFERENTIAL CSF       Imaging   MR Brain w/o & w Contrast   Final Result   IMPRESSION:   1.  No acute intracranial abnormality.   2.  A couple foci of nonspecific and nonenhancing T2/FLAIR hyperintensity within the white matter are of doubtful clinical significance.             ED Course      Medications Administered   Medications   sodium chloride 0.9% BOLUS 1,000 mL (1,000 mLs Intravenous $New Bag 11/21/24 1501)   dexAMETHasone PF (DECADRON) injection 10 mg (10 mg Intravenous $Given 11/21/24 1502)   cefTRIAXone (ROCEPHIN) 2 g vial to attach to  ml bag for ADULTS or NS 50 ml bag for PEDS (2 g Intravenous $New Bag 11/21/24 1501)   ketorolac (TORADOL) injection 15 mg (15 mg Intravenous $Given 11/21/24 1504)   metoclopramide (REGLAN) injection 10 mg (10 mg Intravenous $Given 11/21/24 1503)   diphenhydrAMINE (BENADRYL) injection 25 mg (25 mg Intravenous $Given 11/21/24 1501)   gadobutrol (GADAVIST) injection 6 mL (6 mLs Intravenous $Given 11/21/24 1829)   sodium chloride (PF) 0.9% PF flush 10 mL (10 mLs Intravenous $Given 11/21/24 1830)       Procedures   Procedures     Lumbar Puncture      Procedure: Lumbar Puncture      Indication: headache     Consent: Written from Patient  Risks Discussed (including but not limited to): Infection, Bleeding, Spinal headache with possibility of spinal patch, and Temporary or permanent neurological injury     Universal Protocol: Universal protocol was followed and time out conducted just prior to starting procedure, confirming patient identity, site/side, procedure, patient position, and availability of  correct equipment and implants.      Anesthesia/Sedation: Lidocaine - 1%     Procedure Note:     Patient was placed in a sitting position.  The skin overlying the lumbar area was prepped with povidone-iodine.    The patient was medicated as above.   A 22 gauge spinal needle was used to gain access to the subarachnoid space with stylet in place.   Opening pressure was not measured but very slow trickle of fluid was noted from spinal needle.   The fluid was clear.   Stylet was replaced and needle withdrawn.      Patient Status:  The patient tolerated the procedure well: Yes. There were no complications.      Medical Decision Making / Diagnosis         JACK Walker is a 24 year old female who presents to the ER for evaluation of persistent headaches and neck pain and intermittent visual disturbance involving the entire visual field of both eyes that is described as a static sensation.  She was seen at the emergency room 2 days ago and had negative CT of the head and CTA of the head and neck.  She returns for persistent symptoms.  On arrival she is neurologically intact.  She has some pain with neck flexion.  She was given empiric antibiotics while awaiting lumbar puncture here in the ER.  Lumbar puncture initial studies do not suggest likely meningitis.  MRI of the brain is negative for acute pathology.  She does not have clinical signs of deep space neck pathology such as PTA, RPA, epiglottitis.  She has been taking ciprofloxacin for UTI but urinalysis today is unremarkable.  Patient was feeling improved after medications and desired discharge rather than further observation in the hospital.  I will refer her to neurology promptly for further evaluation.  Considered complex migraine.  Lower suspicion for TIA based on symptoms.  Return precautions discussed.    Disposition   The patient was discharged.     Diagnosis     ICD-10-CM    1. Nonintractable headache, unspecified chronicity pattern, unspecified  headache type  R51.9 Adult Neurology  Referral      2. Neck pain  M54.2 Adult Neurology  Referral      3. Visual disturbance  H53.9 Adult Neurology  Referral           Discharge Medications   New Prescriptions    No medications on file        Jose Maria Amin MD  11/21/24 3308

## 2024-11-21 NOTE — PHARMACY-ADMISSION MEDICATION HISTORY
Pharmacist Admission Medication History    Admission medication history is complete. The information provided in this note is only as accurate as the sources available at the time of the update.    Information Source(s): Patient, Prescription bottles, and CareEverywhere/SureScripts via in-person    Pertinent Information: Recent antimicrobials:  On 11/18/24, patient was prescribed Keflex but changed to Ciprofloxacin 500mg BID for 5 days. Patient has 5 pills left of the course of antibiotics.     Changes made to PTA medication list:  Added: None  Deleted: None  Changed: None    Allergies reviewed with patient and updates made in EHR: yes    Medication History Completed By: Hanna Jay RPH 11/21/2024 2:50 PM    PTA Med List   Medication Sig Last Dose/Taking    ciprofloxacin (CIPRO) 500 MG tablet Take 1 tablet (500 mg) by mouth 2 times daily. 11/20/2024 Evening    metoprolol succinate ER (TOPROL XL) 25 MG 24 hr tablet Take 1 tablet (25 mg) by mouth daily (Patient taking differently: Take 12.5 mg by mouth daily.) 11/20/2024 Evening    norethindrone (MICRONOR) 0.35 MG tablet Take 0.35 mg by mouth daily. 11/20/2024 Evening    ondansetron (ZOFRAN ODT) 4 MG ODT tab Take 1 tablet (4 mg) by mouth every 12 hours as needed for nausea Taking As Needed    rizatriptan (MAXALT) 5 MG tablet Take 1 tablet (5 mg) by mouth at onset of headache for migraine May repeat in 2 hours. Max 6 tablets/24 hours. 11/21/2024 at 10:30 AM

## 2024-11-21 NOTE — ED TRIAGE NOTES
Neck pain for 2 weeks, Tuesday seen at  for intermittent vision loss, had work ups had negative. Was told to come to ED for meningitis work up is symptoms persisted, reports neck pain and vision issues on going.      Triage Assessment (Adult)       Row Name 11/21/24 7373          Triage Assessment    Airway WDL WDL        Respiratory WDL    Respiratory WDL WDL        Skin Circulation/Temperature WDL    Skin Circulation/Temperature WDL WDL        Cardiac WDL    Cardiac WDL X        Peripheral/Neurovascular WDL    Peripheral Neurovascular WDL X        Cognitive/Neuro/Behavioral WDL    Cognitive/Neuro/Behavioral WDL WDL

## 2024-11-21 NOTE — Clinical Note
Sola Walker was seen and treated in our emergency department on 11/21/2024.  She may return to work on 11/25/2024.       If you have any questions or concerns, please don't hesitate to call.      Jose Maria Amin MD

## 2024-11-25 ENCOUNTER — PATIENT OUTREACH (OUTPATIENT)
Dept: CARE COORDINATION | Facility: CLINIC | Age: 24
End: 2024-11-25
Payer: COMMERCIAL

## 2024-11-25 NOTE — PROGRESS NOTES
[facility]    Background: Primary Care-Care Coordination initial outreach identified per system criteria within ED discharge report and reviewed by [facility] team.    Assessment: Upon chart review, [facility] Team member will not proceed with patient outreach related to this episode of Primary Care-Care Coordination program due to reason below:    Patient has 1 ED visit in past year and 1 urgent care visit (through [facility] which is counting in metrics as an ED level visit) for evaluation of migraine.      Plan: After review of chart and routine communication with PCP and care team, no ED outreach call indicated at this time. Primary Care-Care Coordination episode addressed appropriately per reason noted above.      Gay Goode, ADINAN, RN   Manager of Ambulatory Care Management  [facility]

## 2024-11-26 LAB
BACTERIA BLD CULT: NO GROWTH
BACTERIA CSF CULT: NO GROWTH
GRAM STAIN RESULT: NORMAL
GRAM STAIN RESULT: NORMAL

## 2024-11-27 ENCOUNTER — MYC MEDICAL ADVICE (OUTPATIENT)
Dept: PEDIATRICS | Facility: CLINIC | Age: 24
End: 2024-11-27
Payer: COMMERCIAL

## 2024-11-27 DIAGNOSIS — N39.0 RECURRENT UTI: Primary | ICD-10-CM

## 2024-11-27 RX ORDER — NITROFURANTOIN 25; 75 MG/1; MG/1
100 CAPSULE ORAL 2 TIMES DAILY
Qty: 14 CAPSULE | Refills: 0 | Status: SHIPPED | OUTPATIENT
Start: 2024-11-27

## 2024-11-27 NOTE — TELEPHONE ENCOUNTER
Patient sending update from last Office visit w/ Sylvia Nowak CNP r/t recurrent UTI. Do you have recommendation or is visit advised?     Kaveh HIGUERA RN 11/27/2024 at 7:20 AM

## 2024-12-10 DIAGNOSIS — B37.9 ANTIBIOTIC-INDUCED YEAST INFECTION: Primary | ICD-10-CM

## 2024-12-10 DIAGNOSIS — T36.95XA ANTIBIOTIC-INDUCED YEAST INFECTION: Primary | ICD-10-CM

## 2024-12-10 RX ORDER — FLUCONAZOLE 150 MG/1
150 TABLET ORAL ONCE
Qty: 1 TABLET | Refills: 0 | Status: SHIPPED | OUTPATIENT
Start: 2024-12-10 | End: 2024-12-10

## 2024-12-10 NOTE — TELEPHONE ENCOUNTER
Please see 11/27 encounter regarding med refill.     Disp Refills Start End ABDIAZIZ   fluconazole (DIFLUCAN) 150 MG tablet 1 tablet 0 12/4/2024 12/4/2024 --   Sig - Route: Take 1 tablet (150 mg) by mouth once for 1 dose. - Oral       [id number] - [place], [place] - [address] AT [address] [place] [address] & [address]    Anjali Ramsey on 12/10/2024 at 8:34 AM

## 2024-12-10 NOTE — TELEPHONE ENCOUNTER
"Per Sylvia Nowak [facility] 12/4/24:  \"Yes, stop the cephalexin today. Start the nitrofurantion today or tomorrow. Take the diflucan today. You might need another dose after completion of antibiotics.\"      Velma FLOR RN on 12/10/2024 at 11:24 AM       "

## 2025-01-31 ENCOUNTER — TRANSFERRED RECORDS (OUTPATIENT)
Dept: HEALTH INFORMATION MANAGEMENT | Facility: CLINIC | Age: 25
End: 2025-01-31
Payer: COMMERCIAL

## 2025-03-13 ENCOUNTER — TRANSFERRED RECORDS (OUTPATIENT)
Dept: HEALTH INFORMATION MANAGEMENT | Facility: CLINIC | Age: 25
End: 2025-03-13
Payer: COMMERCIAL

## 2025-03-31 ENCOUNTER — PATIENT OUTREACH (OUTPATIENT)
Dept: CARE COORDINATION | Facility: CLINIC | Age: 25
End: 2025-03-31
Payer: COMMERCIAL

## 2025-04-14 ENCOUNTER — PATIENT OUTREACH (OUTPATIENT)
Dept: CARE COORDINATION | Facility: CLINIC | Age: 25
End: 2025-04-14
Payer: COMMERCIAL

## 2025-04-17 ENCOUNTER — TRANSFERRED RECORDS (OUTPATIENT)
Dept: HEALTH INFORMATION MANAGEMENT | Facility: CLINIC | Age: 25
End: 2025-04-17
Payer: COMMERCIAL

## 2025-04-22 ENCOUNTER — TRANSFERRED RECORDS (OUTPATIENT)
Dept: HEALTH INFORMATION MANAGEMENT | Facility: CLINIC | Age: 25
End: 2025-04-22
Payer: COMMERCIAL

## 2025-06-14 ENCOUNTER — HEALTH MAINTENANCE LETTER (OUTPATIENT)
Age: 25
End: 2025-06-14